# Patient Record
Sex: FEMALE | Race: WHITE | NOT HISPANIC OR LATINO | Employment: FULL TIME | URBAN - NONMETROPOLITAN AREA
[De-identification: names, ages, dates, MRNs, and addresses within clinical notes are randomized per-mention and may not be internally consistent; named-entity substitution may affect disease eponyms.]

---

## 2023-03-21 DIAGNOSIS — I10 HYPERTENSION, UNSPECIFIED TYPE: Primary | ICD-10-CM

## 2023-03-23 RX ORDER — OLMESARTAN MEDOXOMIL AND HYDROCHLOROTHIAZIDE 20/12.5 20; 12.5 MG/1; MG/1
TABLET ORAL
Qty: 30 TABLET | Refills: 0 | Status: SHIPPED | OUTPATIENT
Start: 2023-03-23 | End: 2023-04-12 | Stop reason: SDUPTHER

## 2023-04-06 RX ORDER — ALBUTEROL SULFATE 90 UG/1
AEROSOL, METERED RESPIRATORY (INHALATION)
COMMUNITY
Start: 2020-09-17 | End: 2023-04-12

## 2023-04-06 RX ORDER — ZALEPLON 10 MG/1
CAPSULE ORAL
COMMUNITY
Start: 2020-09-17

## 2023-04-06 RX ORDER — CETIRIZINE HYDROCHLORIDE 10 MG/1
TABLET ORAL
COMMUNITY

## 2023-04-06 RX ORDER — OMEPRAZOLE 20 MG/1
TABLET, DELAYED RELEASE ORAL
COMMUNITY
Start: 2022-07-12

## 2023-04-06 RX ORDER — HYDROXYZINE PAMOATE 25 MG/1
CAPSULE ORAL
COMMUNITY
End: 2023-04-12 | Stop reason: SDUPTHER

## 2023-04-06 RX ORDER — CLINDAMYCIN PHOSPHATE 10 MG/G
GEL TOPICAL
COMMUNITY
Start: 2020-09-17

## 2023-04-06 RX ORDER — KRILL/OM-3/DHA/EPA/PHOSPHO/AST 1000-170MG
CAPSULE ORAL
COMMUNITY
Start: 2020-09-17

## 2023-04-06 RX ORDER — FLUTICASONE PROPIONATE 50 MCG
SPRAY, SUSPENSION (ML) NASAL
COMMUNITY
Start: 2022-12-06 | End: 2023-09-26 | Stop reason: ALTCHOICE

## 2023-04-06 RX ORDER — ACETAMINOPHEN 500 MG
1 TABLET ORAL DAILY
COMMUNITY
Start: 2020-12-01

## 2023-04-06 RX ORDER — CETIRIZINE HYDROCHLORIDE, PSEUDOEPHEDRINE HYDROCHLORIDE 5; 120 MG/1; MG/1
1 TABLET, FILM COATED, EXTENDED RELEASE ORAL EVERY 12 HOURS
COMMUNITY
Start: 2020-09-17 | End: 2023-04-12

## 2023-04-11 PROBLEM — J30.9 ALLERGIC RHINITIS: Status: ACTIVE | Noted: 2023-04-11

## 2023-04-11 PROBLEM — R42 VERTIGO: Status: ACTIVE | Noted: 2023-04-11

## 2023-04-11 PROBLEM — I10 BENIGN ESSENTIAL HYPERTENSION: Status: ACTIVE | Noted: 2023-04-11

## 2023-04-11 PROBLEM — N28.9 ABNORMAL KIDNEY FUNCTION: Status: ACTIVE | Noted: 2023-04-11

## 2023-04-11 PROBLEM — E78.5 HYPERLIPIDEMIA: Status: ACTIVE | Noted: 2023-04-11

## 2023-04-11 PROBLEM — E55.9 VITAMIN D DEFICIENCY: Status: ACTIVE | Noted: 2023-04-11

## 2023-04-11 PROBLEM — K21.9 ESOPHAGEAL REFLUX: Status: ACTIVE | Noted: 2023-04-11

## 2023-04-11 PROBLEM — R06.02 SHORTNESS OF BREATH: Status: ACTIVE | Noted: 2023-04-11

## 2023-04-11 PROBLEM — G47.00 INSOMNIA: Status: ACTIVE | Noted: 2023-04-11

## 2023-04-11 PROBLEM — R94.4 DECREASED GFR: Status: ACTIVE | Noted: 2023-04-11

## 2023-04-11 PROBLEM — N90.4 LICHEN SCLEROSUS ET ATROPHICUS OF THE VULVA: Status: ACTIVE | Noted: 2023-04-11

## 2023-04-11 PROBLEM — G47.33 MILD OBSTRUCTIVE SLEEP APNEA: Status: ACTIVE | Noted: 2023-04-11

## 2023-04-11 PROBLEM — L81.9 DISCOLORATION OF SKIN OF FOOT: Status: ACTIVE | Noted: 2023-04-11

## 2023-04-11 PROBLEM — M81.0 POST-MENOPAUSAL OSTEOPOROSIS: Status: ACTIVE | Noted: 2023-04-11

## 2023-04-12 ENCOUNTER — OFFICE VISIT (OUTPATIENT)
Dept: PRIMARY CARE | Facility: CLINIC | Age: 63
End: 2023-04-12
Payer: COMMERCIAL

## 2023-04-12 VITALS
WEIGHT: 293 LBS | HEART RATE: 75 BPM | BODY MASS INDEX: 44.31 KG/M2 | OXYGEN SATURATION: 97 % | SYSTOLIC BLOOD PRESSURE: 116 MMHG | DIASTOLIC BLOOD PRESSURE: 78 MMHG

## 2023-04-12 DIAGNOSIS — I10 HYPERTENSION, UNSPECIFIED TYPE: Primary | ICD-10-CM

## 2023-04-12 DIAGNOSIS — K21.9 GASTROESOPHAGEAL REFLUX DISEASE WITHOUT ESOPHAGITIS: ICD-10-CM

## 2023-04-12 DIAGNOSIS — E66.01 CLASS 3 SEVERE OBESITY DUE TO EXCESS CALORIES WITH SERIOUS COMORBIDITY AND BODY MASS INDEX (BMI) OF 40.0 TO 44.9 IN ADULT (MULTI): ICD-10-CM

## 2023-04-12 DIAGNOSIS — G47.00 INSOMNIA, UNSPECIFIED TYPE: ICD-10-CM

## 2023-04-12 DIAGNOSIS — J30.9 ALLERGIC RHINITIS, UNSPECIFIED SEASONALITY, UNSPECIFIED TRIGGER: ICD-10-CM

## 2023-04-12 PROBLEM — H43.393 VITREOUS FLOATERS OF BOTH EYES: Status: ACTIVE | Noted: 2023-03-28

## 2023-04-12 PROBLEM — H25.13 AGE-RELATED NUCLEAR CATARACT OF BOTH EYES: Status: ACTIVE | Noted: 2023-03-28

## 2023-04-12 PROCEDURE — 1036F TOBACCO NON-USER: CPT | Performed by: INTERNAL MEDICINE

## 2023-04-12 PROCEDURE — 3008F BODY MASS INDEX DOCD: CPT | Performed by: INTERNAL MEDICINE

## 2023-04-12 PROCEDURE — 3078F DIAST BP <80 MM HG: CPT | Performed by: INTERNAL MEDICINE

## 2023-04-12 PROCEDURE — 99214 OFFICE O/P EST MOD 30 MIN: CPT | Performed by: INTERNAL MEDICINE

## 2023-04-12 PROCEDURE — 1160F RVW MEDS BY RX/DR IN RCRD: CPT | Performed by: INTERNAL MEDICINE

## 2023-04-12 PROCEDURE — G0447 BEHAVIOR COUNSEL OBESITY 15M: HCPCS | Performed by: INTERNAL MEDICINE

## 2023-04-12 PROCEDURE — 3074F SYST BP LT 130 MM HG: CPT | Performed by: INTERNAL MEDICINE

## 2023-04-12 PROCEDURE — 1159F MED LIST DOCD IN RCRD: CPT | Performed by: INTERNAL MEDICINE

## 2023-04-12 RX ORDER — OLMESARTAN MEDOXOMIL AND HYDROCHLOROTHIAZIDE 20/12.5 20; 12.5 MG/1; MG/1
1 TABLET ORAL DAILY
Qty: 90 TABLET | Refills: 0 | Status: SHIPPED | OUTPATIENT
Start: 2023-04-12 | End: 2023-07-25

## 2023-04-12 RX ORDER — HYDROXYZINE PAMOATE 25 MG/1
25 CAPSULE ORAL 2 TIMES DAILY
Qty: 180 CAPSULE | Refills: 0 | Status: SHIPPED | OUTPATIENT
Start: 2023-04-12 | End: 2023-07-09

## 2023-04-12 NOTE — PROGRESS NOTES
Subjective   Patient ID: Arianne Jolly is a 63 y.o. female who presents for Follow-up (3 mth medical management).    HPI    Patient presented for routine follow-up.    Hypertension stable on therapy no side effects    Insomnia stable on therapy no side effects    Allergic rhinitis stable on therapy no side effects    GERD stable on therapy no side effects    Diet and exercise reviewed  I spent >15 minutes minutes face to face with individual  providing recommendations for nutrition choices and exercise plan to help achieve weight reduction.     Review of Systems   All other systems reviewed and are negative.      Objective   Physical Exam  Vitals reviewed.   Constitutional:       Appearance: Normal appearance. She is obese.   HENT:      Head: Normocephalic and atraumatic.      Mouth/Throat:      Pharynx: No posterior oropharyngeal erythema.   Eyes:      General: No scleral icterus.     Conjunctiva/sclera: Conjunctivae normal.      Pupils: Pupils are equal, round, and reactive to light.   Cardiovascular:      Rate and Rhythm: Normal rate and regular rhythm.      Heart sounds: Normal heart sounds.   Pulmonary:      Effort: No respiratory distress.      Breath sounds: No wheezing.   Abdominal:      General: Abdomen is flat. Bowel sounds are normal. There is no distension.      Palpations: Abdomen is soft. There is no mass.      Tenderness: There is no abdominal tenderness. There is no rebound.   Musculoskeletal:         General: Normal range of motion.      Cervical back: Normal range of motion and neck supple.   Skin:     General: Skin is warm and dry.   Neurological:      General: No focal deficit present.      Mental Status: She is alert and oriented to person, place, and time. Mental status is at baseline.   Psychiatric:         Mood and Affect: Mood normal.         Behavior: Behavior normal.         Thought Content: Thought content normal.         Judgment: Judgment normal.         Assessment/Plan   Problem List  Items Addressed This Visit          Nervous    Insomnia    Relevant Medications    hydrOXYzine pamoate (Vistaril) 25 mg capsule       Digestive    Esophageal reflux       Other    Allergic rhinitis     Other Visit Diagnoses       Hypertension, unspecified type    -  Primary    Relevant Medications    olmesartan-hydrochlorothiazide (BENIcar HCT) 20-12.5 mg tablet    Class 3 severe obesity due to excess calories with serious comorbidity and body mass index (BMI) of 40.0 to 44.9 in adult (CMS/[facility])                Hypertension stable on therapy no side effects    Insomnia stable on therapy no side effects    Allergic rhinitis stable on therapy no side effects    GERD stable on therapy no side effects    Diet and exercise reviewed    Follow up 4 months / spending time in [place]

## 2023-07-09 DIAGNOSIS — G47.00 INSOMNIA, UNSPECIFIED TYPE: ICD-10-CM

## 2023-07-09 RX ORDER — HYDROXYZINE PAMOATE 25 MG/1
25 CAPSULE ORAL 2 TIMES DAILY
Qty: 180 CAPSULE | Refills: 0 | Status: SHIPPED | OUTPATIENT
Start: 2023-07-09 | End: 2023-10-06

## 2023-07-24 DIAGNOSIS — I10 HYPERTENSION, UNSPECIFIED TYPE: ICD-10-CM

## 2023-07-25 RX ORDER — OLMESARTAN MEDOXOMIL AND HYDROCHLOROTHIAZIDE 20/12.5 20; 12.5 MG/1; MG/1
1 TABLET ORAL DAILY
Qty: 90 TABLET | Refills: 0 | Status: SHIPPED | OUTPATIENT
Start: 2023-07-25 | End: 2023-10-23

## 2023-08-25 PROBLEM — R10.9 ABDOMINAL PAIN: Status: ACTIVE | Noted: 2023-08-25

## 2023-08-25 PROBLEM — R09.1 PLEURISY: Status: ACTIVE | Noted: 2023-08-25

## 2023-08-25 RX ORDER — MELOXICAM 7.5 MG/1
TABLET ORAL 2 TIMES DAILY
COMMUNITY
Start: 2023-08-21 | End: 2023-08-30

## 2023-08-29 ENCOUNTER — OFFICE VISIT (OUTPATIENT)
Dept: PRIMARY CARE | Facility: CLINIC | Age: 63
End: 2023-08-29
Payer: COMMERCIAL

## 2023-08-29 VITALS
OXYGEN SATURATION: 97 % | WEIGHT: 293 LBS | HEART RATE: 103 BPM | SYSTOLIC BLOOD PRESSURE: 112 MMHG | DIASTOLIC BLOOD PRESSURE: 62 MMHG | BODY MASS INDEX: 42.3 KG/M2

## 2023-08-29 DIAGNOSIS — R09.1 PLEURISY: Primary | ICD-10-CM

## 2023-08-29 DIAGNOSIS — I10 BENIGN ESSENTIAL HYPERTENSION: ICD-10-CM

## 2023-08-29 DIAGNOSIS — Q44.6 CYSTIC DISEASE OF LIVER: ICD-10-CM

## 2023-08-29 DIAGNOSIS — K80.20 CALCULUS OF GALLBLADDER WITHOUT CHOLECYSTITIS WITHOUT OBSTRUCTION: ICD-10-CM

## 2023-08-29 DIAGNOSIS — K21.9 GASTROESOPHAGEAL REFLUX DISEASE WITHOUT ESOPHAGITIS: ICD-10-CM

## 2023-08-29 DIAGNOSIS — E66.01 CLASS 3 SEVERE OBESITY DUE TO EXCESS CALORIES WITH SERIOUS COMORBIDITY AND BODY MASS INDEX (BMI) OF 40.0 TO 44.9 IN ADULT (MULTI): ICD-10-CM

## 2023-08-29 PROCEDURE — 3074F SYST BP LT 130 MM HG: CPT | Performed by: INTERNAL MEDICINE

## 2023-08-29 PROCEDURE — 3008F BODY MASS INDEX DOCD: CPT | Performed by: INTERNAL MEDICINE

## 2023-08-29 PROCEDURE — 3078F DIAST BP <80 MM HG: CPT | Performed by: INTERNAL MEDICINE

## 2023-08-29 PROCEDURE — 1036F TOBACCO NON-USER: CPT | Performed by: INTERNAL MEDICINE

## 2023-08-29 PROCEDURE — 99214 OFFICE O/P EST MOD 30 MIN: CPT | Performed by: INTERNAL MEDICINE

## 2023-08-29 PROCEDURE — 1159F MED LIST DOCD IN RCRD: CPT | Performed by: INTERNAL MEDICINE

## 2023-08-29 PROCEDURE — 1160F RVW MEDS BY RX/DR IN RCRD: CPT | Performed by: INTERNAL MEDICINE

## 2023-08-29 NOTE — PROGRESS NOTES
Subjective   Patient ID: Arianne Jolly is a 63 y.o. female who presents for ER Follow-up (Pleurisy 8-21-23).  ER Follow-up    Post ER follow up    Dx pleurisy  residual dry cough no fever or dyspnea       Cholelithiasis asympt       Liver cysts on CT / US    Hypertension stable on therapy no side effects     Insomnia stable on therapy no side effects     Allergic rhinitis stable on therapy no side effects     GERD stable on therapy no side effects     Diet and exercise reviewed    Review of Systems   All other systems reviewed and are negative.      Objective   /62   Pulse 103   Wt 134 kg (294 lb 12.8 oz)   SpO2 97%   BMI 42.30 kg/m²   Lab Results   Component Value Date    WBC 11.4 (H) 08/21/2023    HGB 12.7 08/21/2023    HCT 39.6 08/21/2023     08/21/2023    CHOL 181 11/17/2022    TRIG 134 11/17/2022    HDL 55.0 11/17/2022    ALT 57 (H) 08/21/2023    AST 36 08/21/2023     08/21/2023    K 3.4 (L) 08/21/2023     08/21/2023    CREATININE 1.07 (H) 08/21/2023    BUN 22 08/21/2023    CO2 28 08/21/2023    TSH 2.91 11/17/2022    HGBA1C 5.9 09/30/2020           Physical Exam  Vitals reviewed.   Constitutional:       Appearance: Normal appearance. She is obese.   HENT:      Head: Normocephalic and atraumatic.      Mouth/Throat:      Pharynx: No posterior oropharyngeal erythema.   Eyes:      General: No scleral icterus.     Conjunctiva/sclera: Conjunctivae normal.      Pupils: Pupils are equal, round, and reactive to light.   Cardiovascular:      Rate and Rhythm: Normal rate and regular rhythm.      Heart sounds: Normal heart sounds.   Pulmonary:      Effort: No respiratory distress.      Breath sounds: No wheezing.   Abdominal:      General: Abdomen is flat. Bowel sounds are normal. There is no distension.      Palpations: Abdomen is soft. There is no mass.      Tenderness: There is no abdominal tenderness. There is no rebound.   Musculoskeletal:         General: Normal range of motion.       Cervical back: Normal range of motion and neck supple.   Skin:     General: Skin is warm and dry.   Neurological:      General: No focal deficit present.      Mental Status: She is alert and oriented to person, place, and time. Mental status is at baseline.   Psychiatric:         Mood and Affect: Mood normal.         Behavior: Behavior normal.         Thought Content: Thought content normal.         Judgment: Judgment normal.         Problem List Items Addressed This Visit          Cardiac and Vasculature    Benign essential hypertension       Gastrointestinal and Abdominal    Esophageal reflux       Pulmonary and Pneumonias    Pleurisy - Primary     Other Visit Diagnoses       Cystic disease of liver        Relevant Orders    MR liver w and wo IV contrast    Calculus of gallbladder without cholecystitis without obstruction        Class 3 severe obesity due to excess calories with serious comorbidity and body mass index (BMI) of 40.0 to 44.9 in adult (CMS/HCC)              Assessment/Plan     Post ER follow up    Dx pleurisy  residual dry cough no fever or dyspnea pain controlled on rx       Cholelithiasis asymptomatic       Liver cysts on CT / US   MRI ordered    Hypertension stable on therapy no side effects     Insomnia stable on therapy no side effects     Allergic rhinitis stable on therapy no side effects     GERD stable on therapy no side effects     Diet and exercise reviewed    Mammogram 11-22  Dexa 11-22  Cologuard due 23  CT chest lung cancer screening n/a  GYN n/a  immunizations rev'd  BMI 42.3    Follow up 1 month / yearly physical

## 2023-09-26 ENCOUNTER — OFFICE VISIT (OUTPATIENT)
Dept: PRIMARY CARE | Facility: CLINIC | Age: 63
End: 2023-09-26
Payer: COMMERCIAL

## 2023-09-26 VITALS
SYSTOLIC BLOOD PRESSURE: 124 MMHG | DIASTOLIC BLOOD PRESSURE: 84 MMHG | BODY MASS INDEX: 43.4 KG/M2 | HEART RATE: 84 BPM | WEIGHT: 293 LBS | OXYGEN SATURATION: 95 % | HEIGHT: 69 IN

## 2023-09-26 DIAGNOSIS — Q44.6 POLYCYSTIC LIVER DISEASE: ICD-10-CM

## 2023-09-26 DIAGNOSIS — E55.9 VITAMIN D DEFICIENCY: ICD-10-CM

## 2023-09-26 DIAGNOSIS — K80.20 CALCULUS OF GALLBLADDER WITHOUT CHOLECYSTITIS WITHOUT OBSTRUCTION: ICD-10-CM

## 2023-09-26 DIAGNOSIS — K21.9 GASTROESOPHAGEAL REFLUX DISEASE WITHOUT ESOPHAGITIS: ICD-10-CM

## 2023-09-26 DIAGNOSIS — E66.01 CLASS 3 SEVERE OBESITY DUE TO EXCESS CALORIES WITH SERIOUS COMORBIDITY AND BODY MASS INDEX (BMI) OF 40.0 TO 44.9 IN ADULT (MULTI): ICD-10-CM

## 2023-09-26 DIAGNOSIS — Z12.31 ENCOUNTER FOR SCREENING MAMMOGRAM FOR MALIGNANT NEOPLASM OF BREAST: ICD-10-CM

## 2023-09-26 DIAGNOSIS — I10 BENIGN ESSENTIAL HYPERTENSION: ICD-10-CM

## 2023-09-26 DIAGNOSIS — E78.00 HYPERCHOLESTEREMIA: ICD-10-CM

## 2023-09-26 DIAGNOSIS — J30.9 ALLERGIC RHINITIS, UNSPECIFIED SEASONALITY, UNSPECIFIED TRIGGER: ICD-10-CM

## 2023-09-26 DIAGNOSIS — Z00.00 ANNUAL PHYSICAL EXAM: Primary | ICD-10-CM

## 2023-09-26 PROCEDURE — 3008F BODY MASS INDEX DOCD: CPT | Performed by: INTERNAL MEDICINE

## 2023-09-26 PROCEDURE — 99214 OFFICE O/P EST MOD 30 MIN: CPT | Performed by: INTERNAL MEDICINE

## 2023-09-26 PROCEDURE — 3079F DIAST BP 80-89 MM HG: CPT | Performed by: INTERNAL MEDICINE

## 2023-09-26 PROCEDURE — 1159F MED LIST DOCD IN RCRD: CPT | Performed by: INTERNAL MEDICINE

## 2023-09-26 PROCEDURE — 1160F RVW MEDS BY RX/DR IN RCRD: CPT | Performed by: INTERNAL MEDICINE

## 2023-09-26 PROCEDURE — 1170F FXNL STATUS ASSESSED: CPT | Performed by: INTERNAL MEDICINE

## 2023-09-26 PROCEDURE — 1036F TOBACCO NON-USER: CPT | Performed by: INTERNAL MEDICINE

## 2023-09-26 PROCEDURE — 3074F SYST BP LT 130 MM HG: CPT | Performed by: INTERNAL MEDICINE

## 2023-09-26 PROCEDURE — 99397 PER PM REEVAL EST PAT 65+ YR: CPT | Performed by: INTERNAL MEDICINE

## 2023-09-26 RX ORDER — BENZONATATE 100 MG/1
CAPSULE ORAL
COMMUNITY
Start: 2023-09-07 | End: 2024-04-10 | Stop reason: ALTCHOICE

## 2023-09-26 RX ORDER — CETIRIZINE HYDROCHLORIDE, PSEUDOEPHEDRINE HYDROCHLORIDE 5; 120 MG/1; MG/1
TABLET, FILM COATED, EXTENDED RELEASE ORAL
COMMUNITY
Start: 2023-09-25

## 2023-09-26 ASSESSMENT — ACTIVITIES OF DAILY LIVING (ADL)
BATHING: INDEPENDENT
DOING_HOUSEWORK: INDEPENDENT
TAKING_MEDICATION: INDEPENDENT
DRESSING: INDEPENDENT
GROCERY_SHOPPING: INDEPENDENT
MANAGING_FINANCES: INDEPENDENT

## 2023-09-26 ASSESSMENT — PATIENT HEALTH QUESTIONNAIRE - PHQ9
2. FEELING DOWN, DEPRESSED OR HOPELESS: NOT AT ALL
SUM OF ALL RESPONSES TO PHQ9 QUESTIONS 1 AND 2: 0
1. LITTLE INTEREST OR PLEASURE IN DOING THINGS: NOT AT ALL

## 2023-09-26 ASSESSMENT — ENCOUNTER SYMPTOMS
DEPRESSION: 0
LOSS OF SENSATION IN FEET: 1
OCCASIONAL FEELINGS OF UNSTEADINESS: 0

## 2023-09-26 NOTE — PROGRESS NOTES
"Subjective   Patient ID: Arianne Jolly is a 63 y.o. female who presents for Annual Exam and Results (MRI liver).  HPI    Yearly physical    Mammogram 11-22  Dexa 11-22  Cologuard due 23  CT chest lung cancer screening n/a  GYN n/a  immunizations rev'd  BMI 43.6    Follow up    pleurisy  basically resolved    Cholelithiasis asymptomatic    Liver cysts on CT / US   MRI discussed  Colonoscopy due  GI consult     Hypertension stable on therapy no side effects     Insomnia stable on therapy no side effects     Allergic rhinitis stable on therapy no side effects     GERD stable on therapy no side effects     Diet and exercise reviewed     Review of Systems   All other systems reviewed and are negative.      Objective   /84   Pulse 84   Ht 1.753 m (5' 9\")   Wt 134 kg (295 lb 6.4 oz)   SpO2 95%   BMI 43.62 kg/m²   Lab Results   Component Value Date    WBC 11.4 (H) 08/21/2023    HGB 12.7 08/21/2023    HCT 39.6 08/21/2023     08/21/2023    CHOL 181 11/17/2022    TRIG 134 11/17/2022    HDL 55.0 11/17/2022    ALT 57 (H) 08/21/2023    AST 36 08/21/2023     08/21/2023    K 3.4 (L) 08/21/2023     08/21/2023    CREATININE 1.07 (H) 08/21/2023    BUN 22 08/21/2023    CO2 28 08/21/2023    TSH 2.91 11/17/2022    HGBA1C 5.9 09/30/2020           Physical Exam  Vitals reviewed.   Constitutional:       Appearance: Normal appearance. She is obese.   HENT:      Head: Normocephalic and atraumatic.      Mouth/Throat:      Pharynx: No posterior oropharyngeal erythema.   Eyes:      General: No scleral icterus.     Conjunctiva/sclera: Conjunctivae normal.      Pupils: Pupils are equal, round, and reactive to light.   Cardiovascular:      Rate and Rhythm: Normal rate and regular rhythm.      Heart sounds: Normal heart sounds.   Pulmonary:      Effort: No respiratory distress.      Breath sounds: No wheezing.   Abdominal:      General: Abdomen is flat. Bowel sounds are normal. There is no distension.      Palpations: " Abdomen is soft. There is no mass.      Tenderness: There is no abdominal tenderness. There is no rebound.   Musculoskeletal:         General: Normal range of motion.      Cervical back: Normal range of motion and neck supple.   Skin:     General: Skin is warm and dry.   Neurological:      General: No focal deficit present.      Mental Status: She is alert and oriented to person, place, and time. Mental status is at baseline.   Psychiatric:         Mood and Affect: Mood normal.         Behavior: Behavior normal.         Thought Content: Thought content normal.         Judgment: Judgment normal.       Problem List Items Addressed This Visit             ICD-10-CM       Cardiac and Vasculature    Benign essential hypertension I10       ENT    Allergic rhinitis J30.9       Endocrine/Metabolic    Vitamin D deficiency E55.9    Relevant Orders    Vitamin D 25-Hydroxy,Total (for eval of Vitamin D levels)       Gastrointestinal and Abdominal    Esophageal reflux K21.9     Other Visit Diagnoses         Codes    Annual physical exam    -  Primary Z00.00    Relevant Orders    CBC and Auto Differential    Comprehensive Metabolic Panel    Lipid Panel    TSH with reflex to Free T4 if abnormal    Polycystic liver disease     Q44.6    Relevant Orders    Referral to Gastroenterology    Calculus of gallbladder without cholecystitis without obstruction     K80.20    Encounter for screening mammogram for malignant neoplasm of breast     Z12.31    Relevant Orders    BI mammo bilateral screening tomosynthesis    Hypercholesteremia     E78.00    Class 3 severe obesity due to excess calories with serious comorbidity and body mass index (BMI) of 40.0 to 44.9 in adult (CMS/HCC)     E66.01, Z68.41          Assessment/Plan     Yearly physical    Mammogram 11-22  Dexa 11-22  Cologuard due 23  CT chest lung cancer screening n/a  GYN n/a  immunizations rev'd  BMI 43.6    Follow up    pleurisy  basically resolved    Cholelithiasis asymptomatic  Low  fat diet discussed    Liver cysts on CT / US   MRI discussed  Colonoscopy due  GI consult     Hypertension stable on therapy no side effects     Insomnia stable on therapy no side effects     Allergic rhinitis stable on therapy no side effects     GERD stable on therapy no side effects     Diet and exercise reviewed   I spent >15 minutes minutes face to face with individual  providing recommendations for nutrition choices and exercise plan to help achieve weight reduction.     Check labs and mammogram    Follow up 3 months

## 2023-10-05 DIAGNOSIS — G47.00 INSOMNIA, UNSPECIFIED TYPE: ICD-10-CM

## 2023-10-06 RX ORDER — HYDROXYZINE PAMOATE 25 MG/1
25 CAPSULE ORAL 2 TIMES DAILY
Qty: 180 CAPSULE | Refills: 0 | Status: SHIPPED | OUTPATIENT
Start: 2023-10-06 | End: 2024-01-04

## 2023-10-23 DIAGNOSIS — I10 HYPERTENSION, UNSPECIFIED TYPE: ICD-10-CM

## 2023-10-23 RX ORDER — OLMESARTAN MEDOXOMIL AND HYDROCHLOROTHIAZIDE 20/12.5 20; 12.5 MG/1; MG/1
1 TABLET ORAL DAILY
Qty: 90 TABLET | Refills: 0 | Status: SHIPPED | OUTPATIENT
Start: 2023-10-23 | End: 2023-12-19 | Stop reason: SDUPTHER

## 2023-11-29 ENCOUNTER — OFFICE VISIT (OUTPATIENT)
Dept: GASTROENTEROLOGY | Facility: CLINIC | Age: 63
End: 2023-11-29
Payer: COMMERCIAL

## 2023-11-29 VITALS — HEART RATE: 78 BPM | HEIGHT: 69 IN | BODY MASS INDEX: 43.62 KG/M2

## 2023-11-29 DIAGNOSIS — Q44.6 POLYCYSTIC LIVER DISEASE: ICD-10-CM

## 2023-11-29 DIAGNOSIS — K21.9 GASTROESOPHAGEAL REFLUX DISEASE, UNSPECIFIED WHETHER ESOPHAGITIS PRESENT: Primary | ICD-10-CM

## 2023-11-29 PROCEDURE — 1036F TOBACCO NON-USER: CPT | Performed by: INTERNAL MEDICINE

## 2023-11-29 PROCEDURE — 99204 OFFICE O/P NEW MOD 45 MIN: CPT | Performed by: INTERNAL MEDICINE

## 2023-11-29 PROCEDURE — 1159F MED LIST DOCD IN RCRD: CPT | Performed by: INTERNAL MEDICINE

## 2023-11-29 PROCEDURE — 3008F BODY MASS INDEX DOCD: CPT | Performed by: INTERNAL MEDICINE

## 2023-11-29 PROCEDURE — 1160F RVW MEDS BY RX/DR IN RCRD: CPT | Performed by: INTERNAL MEDICINE

## 2023-11-29 NOTE — PROGRESS NOTES
REASON FOR VISIT: To discuss multiple liver cysts    HPI:  Arianne Jolly is a 63 y.o. female with a past medical history of hypertension, morbid obesity, hyperlipidemia, GERD and osteoporosis being evaluated in the office for recent finding of multiple hepatic and renal cysts.  Imaging showed greater than 20 hepatic cysts.  On MRI appear to be benign appearing cyst without concerning features and most consistent with polycystic liver disease.  Patient does not have any known family history of polycystic kidney or liver disease.  Has not had any focal regular right upper quadrant pain symptoms.  Recently diagnosed with pleurisy and the pain is subsided.  Has history of mild cardiac murmur and had remote echo done.          PRIOR ENDOSCOPY    PAST MEDICAL HISTORY  No past medical history on file.    PAST SURGICAL HISTORY  Past Surgical History:   Procedure Laterality Date    OTHER SURGICAL HISTORY  2022    Dilation and curettage       FAMILY HISTORY  No family history on file.    SOCIAL HISTORY  Social History     Tobacco Use    Smoking status: Former     Types: Cigarettes     Quit date:      Years since quittin.9    Smokeless tobacco: Never   Substance Use Topics    Alcohol use: Not Currently     Comment: rarely       REVIEW OF SYSTEMS  CONSTITUTIONAL: negative for fever, chills, fatigue, or unintentional weight loss,   HEENT: negative for icteric sclera, eye pain/redness, or changes in vision/hearing  RESPIRATORY: negative for cough, hemoptysis, wheezing, orthopnea, or dyspnea on exertion  CARDIOVASCULAR: negative for chest pain, palpitations, or syncope   GASTROINTESTINAL: as noted per HPI  GENITOURINARY: negative for dysuria, polyuria, incontinence, or hematuria  MUSCULOSKELETAL: negative for arthralgia, myalgia, or joint swelling/stiffness   INTEGUMENTARY/SKIN: negative jaundice, rash, or skin lesion  HEMATOLOGIC/LYMPHATIC: negative for prolonged bleeding, easy bruising, or swollen lymph  "nodes  ENDOCRINE: negative for cold/heat intolerance, polydipsia, polyuria, or goiter  NEUROLOGIC: negative for headaches, dizziness, tremor, or gait abnormality  PSYCHIATRIC: negative for anxiety, depression, personality changes, or sleep disturbances      A 10 point review of systems was completed and was otherwise negative.    ALLERGIES  Allergies   Allergen Reactions    Cat Hair Standardized Allergenic Extract Other     congestion    Varicella-Zoster Virus Glycoprotein E, Recombinant Swelling    Weed Pollen-Dog Fennel Other     congestion       MEDICATIONS  Current Outpatient Medications   Medication Sig Dispense Refill    benzonatate (Tessalon) 100 mg capsule TAKE 1 TO 2 CAPSULES BY MOUTH 3 TIMES A DAY      cetirizine (ZyrTEC) 10 mg tablet Take by mouth.      cetirizine-pseudoephedrine (ZyrTEC-D) 5-120 mg 12 hr tablet       cholecalciferol (Vitamin D-3) 5,000 Units tablet Take 1 tablet (5,000 Units) by mouth once daily.      clindamycin (Cleocin T) 1 % gel Cleocin-T 1 % GEL   Refills: 0        Start : 17-Sep-2020   Active  30 GM Tube      hydrOXYzine pamoate (Vistaril) 25 mg capsule TAKE 1 CAPSULE (25 MG) BY MOUTH IN THE MORNING AND 1 CAPSULE (25 MG) BEFORE BEDTIME. 180 capsule 0    krill-om-3-dha-epa-phospho-ast (krill oil) 1,831-212-42-80 mg capsule Take by mouth.      LUTEIN-ZEAXANTHIN ORAL Take 1 tablet by mouth once daily.      olmesartan-hydrochlorothiazide (BENIcar HCT) 20-12.5 mg tablet TAKE 1 TABLET BY MOUTH EVERY DAY 90 tablet 0    omeprazole OTC (PriLOSEC OTC) 20 mg EC tablet Take by mouth.      zaleplon (Sonata) 10 mg capsule Take by mouth.       No current facility-administered medications for this visit.       VITALS  Pulse 78   Ht 1.753 m (5' 9\")   BMI 43.62 kg/m²      PHYSICAL EXAM  Alert and oriented in no acute distress    ASSESSMENT/ PLAN  Patient with polycystic liver disease.  I discussed the benign nature of this.  Currently not causing any pain that I discussed with her that if cyst is " large enough pain can be a potential result which case more aggressive measures can be considered including surgical.  She has no family history of cerebral aneurysm and therefore would hold off on any screening for cerebral aneurysms.  There is increased risk of heart valve disease and therefore I would recommend considering a repeat echo at this point given history of murmur.  I discussed with her that this is highly unlikely to affect liver function in the past.  She will notify me if she develops right upper quadrant pain that persists.  We discussed colorectal cancer screening and the risks and benefits of that.  She would like to proceed with Cologuard which she will discuss with her primary care physician.  She will follow-up in the office as needed.        Signature: Cathy Ferrara MD    Date: 11/29/2023  Time: 3:08 PM

## 2023-12-07 ENCOUNTER — HOSPITAL ENCOUNTER (OUTPATIENT)
Dept: RADIOLOGY | Facility: HOSPITAL | Age: 63
Discharge: HOME | End: 2023-12-07
Payer: COMMERCIAL

## 2023-12-07 DIAGNOSIS — M06.4 INFLAMMATORY POLYARTHROPATHY (MULTI): ICD-10-CM

## 2023-12-07 DIAGNOSIS — M54.9 DORSALGIA, UNSPECIFIED: ICD-10-CM

## 2023-12-07 PROCEDURE — 72110 X-RAY EXAM L-2 SPINE 4/>VWS: CPT

## 2023-12-07 PROCEDURE — 73130 X-RAY EXAM OF HAND: CPT | Mod: 50,FY

## 2023-12-19 ENCOUNTER — OFFICE VISIT (OUTPATIENT)
Dept: PRIMARY CARE | Facility: CLINIC | Age: 63
End: 2023-12-19
Payer: COMMERCIAL

## 2023-12-19 VITALS
WEIGHT: 293 LBS | HEART RATE: 76 BPM | DIASTOLIC BLOOD PRESSURE: 80 MMHG | BODY MASS INDEX: 43.83 KG/M2 | OXYGEN SATURATION: 98 % | SYSTOLIC BLOOD PRESSURE: 120 MMHG

## 2023-12-19 DIAGNOSIS — Q44.6 POLYCYSTIC LIVER DISEASE: ICD-10-CM

## 2023-12-19 DIAGNOSIS — E66.01 CLASS 3 SEVERE OBESITY DUE TO EXCESS CALORIES WITH SERIOUS COMORBIDITY AND BODY MASS INDEX (BMI) OF 40.0 TO 44.9 IN ADULT (MULTI): ICD-10-CM

## 2023-12-19 DIAGNOSIS — I10 HYPERTENSION, UNSPECIFIED TYPE: Primary | ICD-10-CM

## 2023-12-19 DIAGNOSIS — Z12.11 ENCOUNTER FOR SCREENING FOR MALIGNANT NEOPLASM OF COLON: ICD-10-CM

## 2023-12-19 PROCEDURE — 1160F RVW MEDS BY RX/DR IN RCRD: CPT | Performed by: INTERNAL MEDICINE

## 2023-12-19 PROCEDURE — 99214 OFFICE O/P EST MOD 30 MIN: CPT | Performed by: INTERNAL MEDICINE

## 2023-12-19 PROCEDURE — 3079F DIAST BP 80-89 MM HG: CPT | Performed by: INTERNAL MEDICINE

## 2023-12-19 PROCEDURE — 1159F MED LIST DOCD IN RCRD: CPT | Performed by: INTERNAL MEDICINE

## 2023-12-19 PROCEDURE — 3008F BODY MASS INDEX DOCD: CPT | Performed by: INTERNAL MEDICINE

## 2023-12-19 PROCEDURE — 3074F SYST BP LT 130 MM HG: CPT | Performed by: INTERNAL MEDICINE

## 2023-12-19 PROCEDURE — 1036F TOBACCO NON-USER: CPT | Performed by: INTERNAL MEDICINE

## 2023-12-19 RX ORDER — OLMESARTAN MEDOXOMIL AND HYDROCHLOROTHIAZIDE 20/12.5 20; 12.5 MG/1; MG/1
1 TABLET ORAL DAILY
Qty: 90 TABLET | Refills: 0 | Status: SHIPPED | OUTPATIENT
Start: 2023-12-19 | End: 2024-04-10 | Stop reason: SDUPTHER

## 2023-12-19 NOTE — PROGRESS NOTES
Subjective   Patient ID: Arianne Jolly is a 63 y.o. female who presents for 3month medical management.  HPI    Routine follow up    No complaints    rheum following arthritis    pleurisy  basically resolved     Cholelithiasis asymptomatic  Low fat diet discussed     Liver cysts on CT / US   MRI discussed  Cologuard ordered  GI no further workup     Hypertension stable on therapy no side effects     Insomnia stable on therapy no side effects     Allergic rhinitis stable on therapy no side effects     GERD stable on therapy no side effects     Diet and exercise reviewed       Check labs and mammogram/ not done    Review of Systems   All other systems reviewed and are negative.      Objective   /80   Pulse 76   Wt 135 kg (296 lb 12.8 oz)   SpO2 98%   BMI 43.83 kg/m²   Lab Results   Component Value Date    WBC 11.4 (H) 08/21/2023    HGB 12.7 08/21/2023    HCT 39.6 08/21/2023     08/21/2023    CHOL 181 11/17/2022    TRIG 134 11/17/2022    HDL 55.0 11/17/2022    ALT 57 (H) 08/21/2023    AST 36 08/21/2023     08/21/2023    K 3.4 (L) 08/21/2023     08/21/2023    CREATININE 1.07 (H) 08/21/2023    BUN 22 08/21/2023    CO2 28 08/21/2023    TSH 2.91 11/17/2022    HGBA1C 5.9 09/30/2020           Physical Exam  Vitals reviewed.   Constitutional:       Appearance: Normal appearance. She is obese.   HENT:      Head: Normocephalic and atraumatic.      Mouth/Throat:      Pharynx: No posterior oropharyngeal erythema.   Eyes:      General: No scleral icterus.     Conjunctiva/sclera: Conjunctivae normal.      Pupils: Pupils are equal, round, and reactive to light.   Cardiovascular:      Rate and Rhythm: Normal rate and regular rhythm.      Heart sounds: Normal heart sounds.   Pulmonary:      Effort: No respiratory distress.      Breath sounds: No wheezing.   Abdominal:      General: Abdomen is flat. Bowel sounds are normal. There is no distension.      Palpations: Abdomen is soft. There is no mass.       Tenderness: There is no abdominal tenderness. There is no rebound.   Musculoskeletal:         General: Normal range of motion.      Cervical back: Normal range of motion and neck supple.   Skin:     General: Skin is warm and dry.   Neurological:      General: No focal deficit present.      Mental Status: She is alert and oriented to person, place, and time. Mental status is at baseline.   Psychiatric:         Mood and Affect: Mood normal.         Behavior: Behavior normal.         Thought Content: Thought content normal.         Judgment: Judgment normal.         Problem List Items Addressed This Visit    None  Visit Diagnoses         Codes    Hypertension, unspecified type    -  Primary I10    Relevant Medications    olmesartan-hydrochlorothiazide (BENIcar HCT) 20-12.5 mg tablet    Polycystic liver disease     Q44.6    Encounter for screening for malignant neoplasm of colon     Z12.11    Relevant Orders    Cologuard® colon cancer screening    Class 3 severe obesity due to excess calories with serious comorbidity and body mass index (BMI) of 40.0 to 44.9 in adult (CMS/[facility])     E66.01, Z68.41          Assessment/Plan   No complaints    rheum following arthritis    pleurisy  basically resolved     Cholelithiasis asymptomatic  Low fat diet discussed     Liver cysts on CT / US   MRI discussed  Cologuard ordered  GI no further workup     Hypertension stable on therapy no side effects     Insomnia stable on therapy no side effects     Allergic rhinitis stable on therapy no side effects     GERD stable on therapy no side effects     Diet and exercise reviewed       Check labs and mammogram/ not done    Mammogram 11-22  Dexa 11-22  Cologuard ordered 23  CT chest lung cancer screening n/a  GYN n/a  immunizations rev'd UTD  BMI 43.8    Follow up 3 months

## 2024-01-02 ENCOUNTER — APPOINTMENT (OUTPATIENT)
Dept: RADIOLOGY | Facility: HOSPITAL | Age: 64
End: 2024-01-02
Payer: COMMERCIAL

## 2024-01-02 DIAGNOSIS — G47.00 INSOMNIA, UNSPECIFIED TYPE: ICD-10-CM

## 2024-01-03 ENCOUNTER — APPOINTMENT (OUTPATIENT)
Dept: GASTROENTEROLOGY | Facility: HOSPITAL | Age: 64
End: 2024-01-03
Payer: COMMERCIAL

## 2024-01-04 RX ORDER — HYDROXYZINE PAMOATE 25 MG/1
25 CAPSULE ORAL 2 TIMES DAILY
Qty: 180 CAPSULE | Refills: 0 | Status: SHIPPED | OUTPATIENT
Start: 2024-01-04 | End: 2024-03-26

## 2024-01-09 ENCOUNTER — HOSPITAL ENCOUNTER (OUTPATIENT)
Dept: RADIOLOGY | Facility: HOSPITAL | Age: 64
Discharge: HOME | End: 2024-01-09
Payer: COMMERCIAL

## 2024-01-09 DIAGNOSIS — Z12.31 ENCOUNTER FOR SCREENING MAMMOGRAM FOR MALIGNANT NEOPLASM OF BREAST: ICD-10-CM

## 2024-01-09 PROCEDURE — 77067 SCR MAMMO BI INCL CAD: CPT | Mod: BILATERAL PROCEDURE | Performed by: RADIOLOGY

## 2024-01-09 PROCEDURE — 77067 SCR MAMMO BI INCL CAD: CPT

## 2024-01-09 PROCEDURE — 77063 BREAST TOMOSYNTHESIS BI: CPT | Mod: BILATERAL PROCEDURE | Performed by: RADIOLOGY

## 2024-01-24 ENCOUNTER — APPOINTMENT (OUTPATIENT)
Dept: GASTROENTEROLOGY | Facility: HOSPITAL | Age: 64
End: 2024-01-24
Payer: COMMERCIAL

## 2024-01-31 LAB — NONINV COLON CA DNA+OCC BLD SCRN STL QL: NORMAL

## 2024-02-19 DIAGNOSIS — R19.5 POSITIVE COLORECTAL CANCER SCREENING USING COLOGUARD TEST: Primary | ICD-10-CM

## 2024-02-19 LAB — NONINV COLON CA DNA+OCC BLD SCRN STL QL: POSITIVE

## 2024-02-20 NOTE — RESULT ENCOUNTER NOTE
Please call the patient regarding her abnormal result.  Needs to have colonoscopy for positive result / ordered

## 2024-03-26 DIAGNOSIS — G47.00 INSOMNIA, UNSPECIFIED TYPE: ICD-10-CM

## 2024-03-26 RX ORDER — HYDROXYZINE PAMOATE 25 MG/1
25 CAPSULE ORAL 2 TIMES DAILY
Qty: 60 CAPSULE | Refills: 0 | Status: SHIPPED | OUTPATIENT
Start: 2024-03-26 | End: 2024-04-23

## 2024-04-09 ENCOUNTER — APPOINTMENT (OUTPATIENT)
Dept: PRIMARY CARE | Facility: CLINIC | Age: 64
End: 2024-04-09
Payer: COMMERCIAL

## 2024-04-10 ENCOUNTER — OFFICE VISIT (OUTPATIENT)
Dept: PRIMARY CARE | Facility: CLINIC | Age: 64
End: 2024-04-10
Payer: COMMERCIAL

## 2024-04-10 VITALS
SYSTOLIC BLOOD PRESSURE: 122 MMHG | OXYGEN SATURATION: 97 % | BODY MASS INDEX: 44.45 KG/M2 | DIASTOLIC BLOOD PRESSURE: 80 MMHG | WEIGHT: 293 LBS | HEART RATE: 85 BPM

## 2024-04-10 DIAGNOSIS — E66.01 CLASS 3 SEVERE OBESITY DUE TO EXCESS CALORIES WITH SERIOUS COMORBIDITY AND BODY MASS INDEX (BMI) OF 40.0 TO 44.9 IN ADULT (MULTI): ICD-10-CM

## 2024-04-10 DIAGNOSIS — K21.9 GASTROESOPHAGEAL REFLUX DISEASE WITHOUT ESOPHAGITIS: ICD-10-CM

## 2024-04-10 DIAGNOSIS — I10 HYPERTENSION, UNSPECIFIED TYPE: ICD-10-CM

## 2024-04-10 DIAGNOSIS — R19.5 POSITIVE COLORECTAL CANCER SCREENING USING COLOGUARD TEST: ICD-10-CM

## 2024-04-10 DIAGNOSIS — Z00.00 ANNUAL PHYSICAL EXAM: Primary | ICD-10-CM

## 2024-04-10 PROCEDURE — 1036F TOBACCO NON-USER: CPT | Performed by: INTERNAL MEDICINE

## 2024-04-10 PROCEDURE — 99214 OFFICE O/P EST MOD 30 MIN: CPT | Performed by: INTERNAL MEDICINE

## 2024-04-10 PROCEDURE — 3074F SYST BP LT 130 MM HG: CPT | Performed by: INTERNAL MEDICINE

## 2024-04-10 PROCEDURE — 3079F DIAST BP 80-89 MM HG: CPT | Performed by: INTERNAL MEDICINE

## 2024-04-10 PROCEDURE — 1160F RVW MEDS BY RX/DR IN RCRD: CPT | Performed by: INTERNAL MEDICINE

## 2024-04-10 PROCEDURE — 1159F MED LIST DOCD IN RCRD: CPT | Performed by: INTERNAL MEDICINE

## 2024-04-10 PROCEDURE — 99397 PER PM REEVAL EST PAT 65+ YR: CPT | Performed by: INTERNAL MEDICINE

## 2024-04-10 PROCEDURE — 3008F BODY MASS INDEX DOCD: CPT | Performed by: INTERNAL MEDICINE

## 2024-04-10 RX ORDER — ALLOPURINOL 100 MG/1
100 TABLET ORAL DAILY
COMMUNITY

## 2024-04-10 RX ORDER — OLMESARTAN MEDOXOMIL AND HYDROCHLOROTHIAZIDE 20/12.5 20; 12.5 MG/1; MG/1
1 TABLET ORAL DAILY
Qty: 90 TABLET | Refills: 0 | Status: SHIPPED | OUTPATIENT
Start: 2024-04-10

## 2024-04-10 NOTE — PROGRESS NOTES
"Subjective   Patient ID: Arianne Jolly \"Sotero" is a 64 y.o. female who presents for yearly physical / Follow-up (4 mth ).  HPI    Yearly physical    Mammogram 1-24  Dexa 11-22 normal  Cologuard +  2-24  CT chest lung cancer screening n/a  GYN n/a  immunizations rev'd UTD  BMI  44.4    Follow up    No complaints    Cologuard positive  Colonoscopy not done yet     rheum following arthritis     pleurisy  basically resolved     Cholelithiasis asymptomatic  Low fat diet discussed     Liver cysts on CT / US   MRI discussed  GI no further workup     Hypertension stable on therapy no side effects     Insomnia stable on therapy no side effects     Allergic rhinitis stable on therapy no side effects     GERD stable on therapy no side effects     Diet and exercise reviewed       Check labs / not done    Review of Systems   All other systems reviewed and are negative.      Objective   /80   Pulse 85   Wt 137 kg (301 lb)   SpO2 97%   BMI 44.45 kg/m²   Lab Results   Component Value Date    WBC 11.4 (H) 08/21/2023    HGB 12.7 08/21/2023    HCT 39.6 08/21/2023     08/21/2023    CHOL 181 11/17/2022    TRIG 134 11/17/2022    HDL 55.0 11/17/2022    ALT 57 (H) 08/21/2023    AST 36 08/21/2023     08/21/2023    K 3.4 (L) 08/21/2023     08/21/2023    CREATININE 1.07 (H) 08/21/2023    BUN 22 08/21/2023    CO2 28 08/21/2023    TSH 2.91 11/17/2022    HGBA1C 5.9 09/30/2020           Physical Exam  Vitals reviewed.   Constitutional:       Appearance: Normal appearance. She is obese.   HENT:      Head: Normocephalic and atraumatic.      Mouth/Throat:      Pharynx: No posterior oropharyngeal erythema.   Eyes:      General: No scleral icterus.     Conjunctiva/sclera: Conjunctivae normal.      Pupils: Pupils are equal, round, and reactive to light.   Cardiovascular:      Rate and Rhythm: Normal rate and regular rhythm.      Heart sounds: Normal heart sounds.   Pulmonary:      Effort: No respiratory distress.      Breath " sounds: No wheezing.   Abdominal:      General: Abdomen is flat. Bowel sounds are normal. There is no distension.      Palpations: Abdomen is soft. There is no mass.      Tenderness: There is no abdominal tenderness. There is no rebound.   Musculoskeletal:         General: Normal range of motion.      Cervical back: Normal range of motion and neck supple.   Skin:     General: Skin is warm and dry.   Neurological:      General: No focal deficit present.      Mental Status: She is alert and oriented to person, place, and time. Mental status is at baseline.   Psychiatric:         Mood and Affect: Mood normal.         Behavior: Behavior normal.         Thought Content: Thought content normal.         Judgment: Judgment normal.         Problem List Items Addressed This Visit             ICD-10-CM    Esophageal reflux K21.9     Other Visit Diagnoses         Codes    Annual physical exam    -  Primary Z00.00    Positive colorectal cancer screening using Cologuard test     R19.5    Hypertension, unspecified type     I10    Relevant Medications    olmesartan-hydrochlorothiazide (BENIcar HCT) 20-12.5 mg tablet    Class 3 severe obesity due to excess calories with serious comorbidity and body mass index (BMI) of 40.0 to 44.9 in adult (CMS/[facility])     E66.01, Z68.41          Assessment/Plan       Yearly physical    Mammogram 1-24  Dexa 11-22 normal  Cologuard +  2-24  CT chest lung cancer screening n/a  GYN n/a  immunizations rev'd UTD  BMI  44.4    Follow up    No complaints    Cologuard positive  Colonoscopy not done yet     rheum following arthritis     pleurisy  basically resolved     Cholelithiasis asymptomatic  Low fat diet discussed     Liver cysts on CT / US   MRI discussed  GI no further workup     Hypertension stable on therapy no side effects     Insomnia stable on therapy no side effects     Allergic rhinitis stable on therapy no side effects     GERD stable on therapy no side effects     Diet and exercise reviewed        Check labs / not done    Follow up 3 months

## 2024-04-16 ENCOUNTER — TELEPHONE (OUTPATIENT)
Dept: PRIMARY CARE | Facility: CLINIC | Age: 64
End: 2024-04-16
Payer: COMMERCIAL

## 2024-04-16 DIAGNOSIS — R19.5 POSITIVE COLORECTAL CANCER SCREENING USING COLOGUARD TEST: Primary | ICD-10-CM

## 2024-04-16 NOTE — TELEPHONE ENCOUNTER
Patient requests that the Gastro referral to Dr. Prasad be changed to Dr. Ferrara.    Please advise how to proceed

## 2024-04-18 ENCOUNTER — TELEPHONE (OUTPATIENT)
Dept: PRIMARY CARE | Facility: CLINIC | Age: 64
End: 2024-04-18
Payer: COMMERCIAL

## 2024-04-18 DIAGNOSIS — R19.5 POSITIVE COLORECTAL CANCER SCREENING USING COLOGUARD TEST: Primary | ICD-10-CM

## 2024-04-18 NOTE — TELEPHONE ENCOUNTER
Patient needs new referral order for colonoscopy, gastro said that the paper said it was just a consult for office visit instead of colonoscopy.

## 2024-04-19 DIAGNOSIS — Z12.11 COLON CANCER SCREENING: ICD-10-CM

## 2024-04-19 RX ORDER — POLYETHYLENE GLYCOL 3350, SODIUM CHLORIDE, SODIUM BICARBONATE, POTASSIUM CHLORIDE 420; 11.2; 5.72; 1.48 G/4L; G/4L; G/4L; G/4L
POWDER, FOR SOLUTION ORAL
Qty: 4000 ML | Refills: 0 | Status: SHIPPED | OUTPATIENT
Start: 2024-04-19

## 2024-04-23 DIAGNOSIS — G47.00 INSOMNIA, UNSPECIFIED TYPE: ICD-10-CM

## 2024-04-23 RX ORDER — HYDROXYZINE PAMOATE 25 MG/1
25 CAPSULE ORAL 2 TIMES DAILY
Qty: 180 CAPSULE | Refills: 0 | Status: SHIPPED | OUTPATIENT
Start: 2024-04-23 | End: 2024-07-22

## 2024-05-29 ENCOUNTER — OFFICE VISIT (OUTPATIENT)
Dept: GASTROENTEROLOGY | Facility: EXTERNAL LOCATION | Age: 64
End: 2024-05-29
Payer: COMMERCIAL

## 2024-05-29 DIAGNOSIS — D12.5 BENIGN NEOPLASM OF SIGMOID COLON: Primary | ICD-10-CM

## 2024-05-29 DIAGNOSIS — R19.5 OTHER FECAL ABNORMALITIES: ICD-10-CM

## 2024-05-29 DIAGNOSIS — Z12.11 ENCOUNTER FOR SCREENING FOR MALIGNANT NEOPLASM OF COLON: ICD-10-CM

## 2024-05-29 DIAGNOSIS — K64.8 INTERNAL HEMORRHOIDS: ICD-10-CM

## 2024-05-29 PROCEDURE — 0753T DGTZ GLS MCRSCP SLD LEVEL IV: CPT

## 2024-05-29 PROCEDURE — 1159F MED LIST DOCD IN RCRD: CPT | Performed by: INTERNAL MEDICINE

## 2024-05-29 PROCEDURE — 45385 COLONOSCOPY W/LESION REMOVAL: CPT | Performed by: INTERNAL MEDICINE

## 2024-05-29 PROCEDURE — 88305 TISSUE EXAM BY PATHOLOGIST: CPT

## 2024-05-29 PROCEDURE — 45381 COLONOSCOPY SUBMUCOUS NJX: CPT | Performed by: INTERNAL MEDICINE

## 2024-05-29 PROCEDURE — 3008F BODY MASS INDEX DOCD: CPT | Performed by: INTERNAL MEDICINE

## 2024-05-29 PROCEDURE — 1160F RVW MEDS BY RX/DR IN RCRD: CPT | Performed by: INTERNAL MEDICINE

## 2024-05-30 ENCOUNTER — LAB REQUISITION (OUTPATIENT)
Dept: LAB | Facility: HOSPITAL | Age: 64
End: 2024-05-30
Payer: COMMERCIAL

## 2024-06-07 LAB
LABORATORY COMMENT REPORT: NORMAL
PATH REPORT.FINAL DX SPEC: NORMAL
PATH REPORT.GROSS SPEC: NORMAL
PATH REPORT.RELEVANT HX SPEC: NORMAL
PATH REPORT.TOTAL CANCER: NORMAL

## 2024-06-07 NOTE — RESULT ENCOUNTER NOTE
The polyp that was removed from your colon was an adenoma (benign but precancerous).  The recommendation is to repeat colonoscopy in 3 years.      Cathy Ferrara MD

## 2024-07-16 DIAGNOSIS — G47.00 INSOMNIA, UNSPECIFIED TYPE: ICD-10-CM

## 2024-07-17 ENCOUNTER — APPOINTMENT (OUTPATIENT)
Dept: PRIMARY CARE | Facility: CLINIC | Age: 64
End: 2024-07-17
Payer: COMMERCIAL

## 2024-07-17 VITALS
DIASTOLIC BLOOD PRESSURE: 76 MMHG | TEMPERATURE: 97.9 F | BODY MASS INDEX: 42.12 KG/M2 | SYSTOLIC BLOOD PRESSURE: 114 MMHG | HEART RATE: 95 BPM | OXYGEN SATURATION: 100 % | WEIGHT: 285.2 LBS

## 2024-07-17 DIAGNOSIS — I10 HYPERTENSION, UNSPECIFIED TYPE: ICD-10-CM

## 2024-07-17 DIAGNOSIS — M19.90 ARTHRITIS: ICD-10-CM

## 2024-07-17 DIAGNOSIS — R73.9 HYPERGLYCEMIA: ICD-10-CM

## 2024-07-17 DIAGNOSIS — E66.01 CLASS 3 SEVERE OBESITY DUE TO EXCESS CALORIES WITH SERIOUS COMORBIDITY AND BODY MASS INDEX (BMI) OF 40.0 TO 44.9 IN ADULT (MULTI): ICD-10-CM

## 2024-07-17 DIAGNOSIS — Z86.010 HISTORY OF COLON POLYPS: ICD-10-CM

## 2024-07-17 DIAGNOSIS — Z71.2 ENCOUNTER TO DISCUSS TEST RESULTS: Primary | ICD-10-CM

## 2024-07-17 PROCEDURE — 3078F DIAST BP <80 MM HG: CPT | Performed by: INTERNAL MEDICINE

## 2024-07-17 PROCEDURE — 99214 OFFICE O/P EST MOD 30 MIN: CPT | Performed by: INTERNAL MEDICINE

## 2024-07-17 PROCEDURE — 1159F MED LIST DOCD IN RCRD: CPT | Performed by: INTERNAL MEDICINE

## 2024-07-17 PROCEDURE — 3074F SYST BP LT 130 MM HG: CPT | Performed by: INTERNAL MEDICINE

## 2024-07-17 PROCEDURE — 1036F TOBACCO NON-USER: CPT | Performed by: INTERNAL MEDICINE

## 2024-07-17 PROCEDURE — 1160F RVW MEDS BY RX/DR IN RCRD: CPT | Performed by: INTERNAL MEDICINE

## 2024-07-17 RX ORDER — HYDROXYZINE PAMOATE 25 MG/1
25 CAPSULE ORAL 2 TIMES DAILY
Qty: 180 CAPSULE | Refills: 0 | Status: SHIPPED | OUTPATIENT
Start: 2024-07-17 | End: 2024-10-15

## 2024-07-17 RX ORDER — OLMESARTAN MEDOXOMIL AND HYDROCHLOROTHIAZIDE 20/12.5 20; 12.5 MG/1; MG/1
1 TABLET ORAL DAILY
Qty: 90 TABLET | Refills: 0 | Status: SHIPPED | OUTPATIENT
Start: 2024-07-17

## 2024-07-17 NOTE — PROGRESS NOTES
"Subjective   Patient ID: Arianne Jolly \"Dionne\" is a 64 y.o. female who presents for 3 month medical management.  HPI    Routine follow up    Labs rev'd     Cologuard positive  Colonoscopy 6-24  polyp recheck 27     rheum following arthritis  Skyrizi pending     pleurisy  basically resolved     Cholelithiasis asymptomatic  Low fat diet discussed     Liver cysts on CT / US   MRI discussed  GI no further workup     Hypertension stable on therapy no side effects     Insomnia stable on therapy no side effects     Allergic rhinitis stable on therapy no side effects     GERD stable on therapy no side effects     Diet and exercise reviewed      Review of Systems   All other systems reviewed and are negative.      Objective   /76   Pulse 95   Temp 36.6 °C (97.9 °F)   Wt 129 kg (285 lb 3.2 oz)   SpO2 100%   BMI 42.12 kg/m²   Dictation #1  MRN:[id number]  CSN:[id number]          Physical Exam  Vitals reviewed.   Constitutional:       Appearance: Normal appearance. She is obese.   HENT:      Head: Normocephalic and atraumatic.      Mouth/Throat:      Pharynx: No posterior oropharyngeal erythema.   Eyes:      General: No scleral icterus.     Conjunctiva/sclera: Conjunctivae normal.      Pupils: Pupils are equal, round, and reactive to light.   Cardiovascular:      Rate and Rhythm: Normal rate and regular rhythm.      Heart sounds: Normal heart sounds.   Pulmonary:      Effort: No respiratory distress.      Breath sounds: No wheezing.   Abdominal:      General: Abdomen is flat. Bowel sounds are normal. There is no distension.      Palpations: Abdomen is soft. There is no mass.      Tenderness: There is no abdominal tenderness. There is no rebound.   Musculoskeletal:         General: Normal range of motion.      Cervical back: Normal range of motion and neck supple.   Skin:     General: Skin is warm and dry.   Neurological:      General: No focal deficit present.      Mental Status: She is alert and oriented to person, " place, and time. Mental status is at baseline.   Psychiatric:         Mood and Affect: Mood normal.         Behavior: Behavior normal.         Thought Content: Thought content normal.         Judgment: Judgment normal.         Problem List Items Addressed This Visit    None  Visit Diagnoses         Codes    Encounter to discuss test results    -  Primary Z71.2    Hyperglycemia     R73.9    Relevant Orders    Hemoglobin A1C    History of colon polyps     Z86.010    Hypertension, unspecified type     I10    Relevant Medications    olmesartan-hydrochlorothiazide (BENIcar HCT) 20-12.5 mg tablet    Arthritis     M19.90    Class 3 severe obesity due to excess calories with serious comorbidity and body mass index (BMI) of 40.0 to 44.9 in adult (Multi)     E66.01, Z68.41          Assessment/Plan     Labs rev'd    Hyperglycemia  on diet  HBA1C 6.0  5-24  Recheck before appt     Cologuard positive  Colonoscopy 6-24  polyp recheck 27     rheum following arthritis  Skyrizi pending     pleurisy  basically resolved     Cholelithiasis asymptomatic  Low fat diet discussed     Liver cysts on CT / US   MRI discussed  GI no further workup     Hypertension stable on therapy no side effects     Insomnia stable on therapy no side effects     Allergic rhinitis stable on therapy no side effects     GERD stable on therapy no side effects     Diet and exercise reviewed      Mammogram 1-24  Dexa 11-22 normal  Cologuard +  2-24  Colonoscopy 6-24  recheck 27 polyp  CT chest lung cancer screening n/a  GYN n/a  immunizations rev'd UTD  BMI  42.1    Follow up 3 months

## 2024-10-08 DIAGNOSIS — G47.00 INSOMNIA, UNSPECIFIED TYPE: ICD-10-CM

## 2024-10-08 RX ORDER — HYDROXYZINE PAMOATE 25 MG/1
25 CAPSULE ORAL 2 TIMES DAILY
Qty: 60 CAPSULE | Refills: 0 | Status: SHIPPED | OUTPATIENT
Start: 2024-10-08 | End: 2024-11-07

## 2024-10-16 ENCOUNTER — APPOINTMENT (OUTPATIENT)
Dept: PRIMARY CARE | Facility: CLINIC | Age: 64
End: 2024-10-16
Payer: COMMERCIAL

## 2024-10-24 ENCOUNTER — LAB (OUTPATIENT)
Dept: LAB | Facility: LAB | Age: 64
End: 2024-10-24
Payer: COMMERCIAL

## 2024-10-24 DIAGNOSIS — R73.9 HYPERGLYCEMIA: ICD-10-CM

## 2024-10-24 LAB
EST. AVERAGE GLUCOSE BLD GHB EST-MCNC: 126 MG/DL
HBA1C MFR BLD: 6 %

## 2024-10-24 PROCEDURE — 36415 COLL VENOUS BLD VENIPUNCTURE: CPT

## 2024-10-24 PROCEDURE — 83036 HEMOGLOBIN GLYCOSYLATED A1C: CPT

## 2024-10-30 ENCOUNTER — APPOINTMENT (OUTPATIENT)
Dept: PRIMARY CARE | Facility: CLINIC | Age: 64
End: 2024-10-30
Payer: COMMERCIAL

## 2024-10-31 ENCOUNTER — APPOINTMENT (OUTPATIENT)
Dept: PRIMARY CARE | Facility: CLINIC | Age: 64
End: 2024-10-31
Payer: COMMERCIAL

## 2024-10-31 VITALS
OXYGEN SATURATION: 91 % | HEART RATE: 78 BPM | DIASTOLIC BLOOD PRESSURE: 82 MMHG | WEIGHT: 282.8 LBS | BODY MASS INDEX: 41.76 KG/M2 | SYSTOLIC BLOOD PRESSURE: 116 MMHG | TEMPERATURE: 97.3 F

## 2024-10-31 DIAGNOSIS — M19.90 ARTHRITIS: ICD-10-CM

## 2024-10-31 DIAGNOSIS — I10 HYPERTENSION, UNSPECIFIED TYPE: ICD-10-CM

## 2024-10-31 DIAGNOSIS — E66.813 CLASS 3 SEVERE OBESITY DUE TO EXCESS CALORIES WITH SERIOUS COMORBIDITY AND BODY MASS INDEX (BMI) OF 40.0 TO 44.9 IN ADULT: ICD-10-CM

## 2024-10-31 DIAGNOSIS — R73.9 HYPERGLYCEMIA: Primary | ICD-10-CM

## 2024-10-31 DIAGNOSIS — K21.9 GASTROESOPHAGEAL REFLUX DISEASE WITHOUT ESOPHAGITIS: ICD-10-CM

## 2024-10-31 DIAGNOSIS — G47.00 INSOMNIA, UNSPECIFIED TYPE: ICD-10-CM

## 2024-10-31 DIAGNOSIS — J30.9 ALLERGIC RHINITIS, UNSPECIFIED SEASONALITY, UNSPECIFIED TRIGGER: ICD-10-CM

## 2024-10-31 DIAGNOSIS — E66.01 CLASS 3 SEVERE OBESITY DUE TO EXCESS CALORIES WITH SERIOUS COMORBIDITY AND BODY MASS INDEX (BMI) OF 40.0 TO 44.9 IN ADULT: ICD-10-CM

## 2024-10-31 PROCEDURE — 1159F MED LIST DOCD IN RCRD: CPT | Performed by: INTERNAL MEDICINE

## 2024-10-31 PROCEDURE — 1036F TOBACCO NON-USER: CPT | Performed by: INTERNAL MEDICINE

## 2024-10-31 PROCEDURE — 3079F DIAST BP 80-89 MM HG: CPT | Performed by: INTERNAL MEDICINE

## 2024-10-31 PROCEDURE — 3074F SYST BP LT 130 MM HG: CPT | Performed by: INTERNAL MEDICINE

## 2024-10-31 PROCEDURE — 1160F RVW MEDS BY RX/DR IN RCRD: CPT | Performed by: INTERNAL MEDICINE

## 2024-10-31 PROCEDURE — 99214 OFFICE O/P EST MOD 30 MIN: CPT | Performed by: INTERNAL MEDICINE

## 2024-10-31 RX ORDER — HYDROXYZINE PAMOATE 25 MG/1
25 CAPSULE ORAL 2 TIMES DAILY
Qty: 180 CAPSULE | Refills: 0 | OUTPATIENT
Start: 2024-10-31 | End: 2025-01-29

## 2024-10-31 RX ORDER — OLMESARTAN MEDOXOMIL AND HYDROCHLOROTHIAZIDE 20/12.5 20; 12.5 MG/1; MG/1
1 TABLET ORAL DAILY
Qty: 90 TABLET | Refills: 0 | Status: SHIPPED | OUTPATIENT
Start: 2024-10-31

## 2024-10-31 RX ORDER — HYDROXYZINE PAMOATE 25 MG/1
25 CAPSULE ORAL 2 TIMES DAILY
Qty: 180 CAPSULE | Refills: 0 | Status: SHIPPED | OUTPATIENT
Start: 2024-10-31 | End: 2025-01-29

## 2025-01-22 ENCOUNTER — APPOINTMENT (OUTPATIENT)
Dept: PRIMARY CARE | Facility: CLINIC | Age: 65
End: 2025-01-22
Payer: COMMERCIAL

## 2025-01-22 ENCOUNTER — LAB (OUTPATIENT)
Dept: LAB | Facility: LAB | Age: 65
End: 2025-01-22
Payer: COMMERCIAL

## 2025-01-22 ENCOUNTER — HOSPITAL ENCOUNTER (OUTPATIENT)
Dept: RADIOLOGY | Facility: HOSPITAL | Age: 65
Discharge: HOME | End: 2025-01-22
Payer: COMMERCIAL

## 2025-01-22 ENCOUNTER — APPOINTMENT (OUTPATIENT)
Dept: RADIOLOGY | Facility: HOSPITAL | Age: 65
End: 2025-01-22
Payer: COMMERCIAL

## 2025-01-22 ENCOUNTER — HOSPITAL ENCOUNTER (EMERGENCY)
Facility: HOSPITAL | Age: 65
Discharge: ANOTHER HOSPITAL | End: 2025-01-23
Attending: STUDENT IN AN ORGANIZED HEALTH CARE EDUCATION/TRAINING PROGRAM
Payer: COMMERCIAL

## 2025-01-22 VITALS
TEMPERATURE: 97.1 F | BODY MASS INDEX: 40.29 KG/M2 | HEART RATE: 99 BPM | SYSTOLIC BLOOD PRESSURE: 102 MMHG | DIASTOLIC BLOOD PRESSURE: 68 MMHG | WEIGHT: 272.8 LBS | OXYGEN SATURATION: 95 %

## 2025-01-22 DIAGNOSIS — Q44.6 POLYCYSTIC LIVER DISEASE: ICD-10-CM

## 2025-01-22 DIAGNOSIS — K83.8 COMMON BILE DUCT DILATATION: ICD-10-CM

## 2025-01-22 DIAGNOSIS — R10.11 RUQ PAIN: ICD-10-CM

## 2025-01-22 DIAGNOSIS — K80.50 CHOLEDOCHOLITHIASIS: ICD-10-CM

## 2025-01-22 DIAGNOSIS — R06.00 DYSPNEA, UNSPECIFIED TYPE: ICD-10-CM

## 2025-01-22 DIAGNOSIS — R10.11 RUQ PAIN: Primary | ICD-10-CM

## 2025-01-22 DIAGNOSIS — K80.20 CALCULUS OF GALLBLADDER WITHOUT CHOLECYSTITIS WITHOUT OBSTRUCTION: ICD-10-CM

## 2025-01-22 DIAGNOSIS — I10 HYPERTENSION, UNSPECIFIED TYPE: ICD-10-CM

## 2025-01-22 DIAGNOSIS — E66.813 CLASS 3 SEVERE OBESITY DUE TO EXCESS CALORIES WITH SERIOUS COMORBIDITY AND BODY MASS INDEX (BMI) OF 40.0 TO 44.9 IN ADULT: ICD-10-CM

## 2025-01-22 DIAGNOSIS — R63.4 ABNORMAL WEIGHT LOSS: ICD-10-CM

## 2025-01-22 DIAGNOSIS — R74.01 TRANSAMINITIS: Primary | ICD-10-CM

## 2025-01-22 DIAGNOSIS — E66.01 CLASS 3 SEVERE OBESITY DUE TO EXCESS CALORIES WITH SERIOUS COMORBIDITY AND BODY MASS INDEX (BMI) OF 40.0 TO 44.9 IN ADULT: ICD-10-CM

## 2025-01-22 LAB
ALBUMIN SERPL BCP-MCNC: 4.1 G/DL (ref 3.4–5)
ALP SERPL-CCNC: 681 U/L (ref 33–136)
ALT SERPL W P-5'-P-CCNC: 395 U/L (ref 7–45)
AMYLASE SERPL-CCNC: 18 U/L (ref 29–103)
ANION GAP SERPL CALC-SCNC: 12 MMOL/L (ref 10–20)
AST SERPL W P-5'-P-CCNC: 226 U/L (ref 9–39)
BASOPHILS # BLD AUTO: 0.06 X10*3/UL (ref 0–0.1)
BASOPHILS NFR BLD AUTO: 0.6 %
BILIRUB SERPL-MCNC: 0.8 MG/DL (ref 0–1.2)
BUN SERPL-MCNC: 22 MG/DL (ref 6–23)
CALCIUM SERPL-MCNC: 9.7 MG/DL (ref 8.6–10.3)
CHLORIDE SERPL-SCNC: 102 MMOL/L (ref 98–107)
CO2 SERPL-SCNC: 28 MMOL/L (ref 21–32)
CREAT SERPL-MCNC: 1.1 MG/DL (ref 0.5–1.05)
EGFRCR SERPLBLD CKD-EPI 2021: 56 ML/MIN/1.73M*2
EOSINOPHIL # BLD AUTO: 0.19 X10*3/UL (ref 0–0.7)
EOSINOPHIL NFR BLD AUTO: 1.9 %
ERYTHROCYTE [DISTWIDTH] IN BLOOD BY AUTOMATED COUNT: 14.9 % (ref 11.5–14.5)
GLUCOSE SERPL-MCNC: 107 MG/DL (ref 74–99)
HCT VFR BLD AUTO: 40.3 % (ref 36–46)
HGB BLD-MCNC: 12.6 G/DL (ref 12–16)
IMM GRANULOCYTES # BLD AUTO: 0.04 X10*3/UL (ref 0–0.7)
IMM GRANULOCYTES NFR BLD AUTO: 0.4 % (ref 0–0.9)
LIPASE SERPL-CCNC: 5 U/L (ref 9–82)
LYMPHOCYTES # BLD AUTO: 1.72 X10*3/UL (ref 1.2–4.8)
LYMPHOCYTES NFR BLD AUTO: 17 %
MCH RBC QN AUTO: 27.1 PG (ref 26–34)
MCHC RBC AUTO-ENTMCNC: 31.3 G/DL (ref 32–36)
MCV RBC AUTO: 87 FL (ref 80–100)
MONOCYTES # BLD AUTO: 0.7 X10*3/UL (ref 0.1–1)
MONOCYTES NFR BLD AUTO: 6.9 %
NEUTROPHILS # BLD AUTO: 7.38 X10*3/UL (ref 1.2–7.7)
NEUTROPHILS NFR BLD AUTO: 73.2 %
NRBC BLD-RTO: 0 /100 WBCS (ref 0–0)
PLATELET # BLD AUTO: 377 X10*3/UL (ref 150–450)
POTASSIUM SERPL-SCNC: 4.2 MMOL/L (ref 3.5–5.3)
PROT SERPL-MCNC: 7.1 G/DL (ref 6.4–8.2)
RBC # BLD AUTO: 4.65 X10*6/UL (ref 4–5.2)
SODIUM SERPL-SCNC: 138 MMOL/L (ref 136–145)
WBC # BLD AUTO: 10.1 X10*3/UL (ref 4.4–11.3)

## 2025-01-22 PROCEDURE — 76376 3D RENDER W/INTRP POSTPROCES: CPT | Performed by: STUDENT IN AN ORGANIZED HEALTH CARE EDUCATION/TRAINING PROGRAM

## 2025-01-22 PROCEDURE — 99214 OFFICE O/P EST MOD 30 MIN: CPT | Performed by: INTERNAL MEDICINE

## 2025-01-22 PROCEDURE — 2550000001 HC RX 255 CONTRASTS: Performed by: STUDENT IN AN ORGANIZED HEALTH CARE EDUCATION/TRAINING PROGRAM

## 2025-01-22 PROCEDURE — A9575 INJ GADOTERATE MEGLUMI 0.1ML: HCPCS | Performed by: STUDENT IN AN ORGANIZED HEALTH CARE EDUCATION/TRAINING PROGRAM

## 2025-01-22 PROCEDURE — 74177 CT ABD & PELVIS W/CONTRAST: CPT

## 2025-01-22 PROCEDURE — 74183 MRI ABD W/O CNTR FLWD CNTR: CPT

## 2025-01-22 PROCEDURE — 3078F DIAST BP <80 MM HG: CPT | Performed by: INTERNAL MEDICINE

## 2025-01-22 PROCEDURE — 71260 CT THORAX DX C+: CPT

## 2025-01-22 PROCEDURE — 1159F MED LIST DOCD IN RCRD: CPT | Performed by: INTERNAL MEDICINE

## 2025-01-22 PROCEDURE — 1036F TOBACCO NON-USER: CPT | Performed by: INTERNAL MEDICINE

## 2025-01-22 PROCEDURE — 99285 EMERGENCY DEPT VISIT HI MDM: CPT | Performed by: STUDENT IN AN ORGANIZED HEALTH CARE EDUCATION/TRAINING PROGRAM

## 2025-01-22 PROCEDURE — 74183 MRI ABD W/O CNTR FLWD CNTR: CPT | Performed by: STUDENT IN AN ORGANIZED HEALTH CARE EDUCATION/TRAINING PROGRAM

## 2025-01-22 PROCEDURE — 1160F RVW MEDS BY RX/DR IN RCRD: CPT | Performed by: INTERNAL MEDICINE

## 2025-01-22 PROCEDURE — 2550000001 HC RX 255 CONTRASTS: Performed by: INTERNAL MEDICINE

## 2025-01-22 PROCEDURE — 3074F SYST BP LT 130 MM HG: CPT | Performed by: INTERNAL MEDICINE

## 2025-01-22 RX ORDER — GADOTERATE MEGLUMINE 376.9 MG/ML
4 INJECTION INTRAVENOUS
Status: COMPLETED | OUTPATIENT
Start: 2025-01-22 | End: 2025-01-22

## 2025-01-22 RX ORDER — GADOTERATE MEGLUMINE 376.9 MG/ML
10 INJECTION INTRAVENOUS
Status: COMPLETED | OUTPATIENT
Start: 2025-01-22 | End: 2025-01-22

## 2025-01-22 RX ADMIN — GADOTERATE MEGLUMINE 10 ML: 376.9 INJECTION INTRAVENOUS at 22:09

## 2025-01-22 RX ADMIN — IOHEXOL 75 ML: 350 INJECTION, SOLUTION INTRAVENOUS at 15:39

## 2025-01-22 RX ADMIN — GADOTERATE MEGLUMINE 4 ML: 376.9 INJECTION INTRAVENOUS at 22:14

## 2025-01-22 ASSESSMENT — PAIN DESCRIPTION - ORIENTATION: ORIENTATION: RIGHT;UPPER

## 2025-01-22 ASSESSMENT — COLUMBIA-SUICIDE SEVERITY RATING SCALE - C-SSRS
1. IN THE PAST MONTH, HAVE YOU WISHED YOU WERE DEAD OR WISHED YOU COULD GO TO SLEEP AND NOT WAKE UP?: NO
6. HAVE YOU EVER DONE ANYTHING, STARTED TO DO ANYTHING, OR PREPARED TO DO ANYTHING TO END YOUR LIFE?: NO
2. HAVE YOU ACTUALLY HAD ANY THOUGHTS OF KILLING YOURSELF?: NO

## 2025-01-22 ASSESSMENT — PAIN DESCRIPTION - LOCATION: LOCATION: ABDOMEN

## 2025-01-22 ASSESSMENT — ENCOUNTER SYMPTOMS
FATIGUE: 1
PAIN: 1
ABDOMINAL PAIN: 1

## 2025-01-22 ASSESSMENT — PAIN - FUNCTIONAL ASSESSMENT: PAIN_FUNCTIONAL_ASSESSMENT: 0-10

## 2025-01-22 ASSESSMENT — PAIN DESCRIPTION - PAIN TYPE: TYPE: ACUTE PAIN

## 2025-01-22 ASSESSMENT — PAIN SCALES - GENERAL
PAINLEVEL_OUTOF10: 2
PAINLEVEL_OUTOF10: 2

## 2025-01-22 NOTE — ED PROVIDER NOTES
CC: Abdominal Pain (Pt to ED following CT scan done earlier today that showed gall stones and was told by PCP to come to ED. Pt with RUQ pain, no nausea, vomiting, diarrhea. No urinary issues, fever, chills, body aches. Pt with trouble with deep breathing as she gets more discomfort in the RUQ. No chest pain. )     HPI:  Patient is a 64-year-old female with a history of hypertension, cholelithiasis and hyperglycemia not on any medications presents to the emergency department after workup that was obtained by her outpatient primary care doctor concerning for dilation of her common bile duct with a transaminitis.  Patient was seen by her primary care doctor today after having 1 month of right flank pain that is worse with movement and deep breathing.  She associates a 10 pound weight loss and nausea but no vomiting.  She has been having normal bowel movements.  She had a workup done today that shows a CT with dilation of the common bile duct and transaminitis with a normal bilirubin.  Patient denies her pain getting worse with food.  She states her father did die of pancreatic cancer.    CT: new biliary ductal dilatation compared to the prior study.  No obvious calcified ductal stone is noted but a noncalcified stone  in the distal duct is possible. There is no pancreatic mass  identified. Ampullary dysfunction or stricture is also consideration.  Consider MRCP.     and .  Reports a history of fatty liver disease however had normal LFTs 1 year ago.  Denies alcohol use     Records Reviewed:  Recent available ED and inpatient notes reviewed in EMR.    PMHx/PSHx:  Per HPI.   - has no past medical history on file.  - has a past surgical history that includes Other surgical history (11/03/2022).  - has Abnormal kidney function; Allergic rhinitis; Mild obstructive sleep apnea; Benign essential hypertension; Decreased GFR; Discoloration of skin of foot; Esophageal reflux; Hyperlipidemia; Insomnia; Lichen  sclerosus et atrophicus of the vulva; Post-menopausal osteoporosis; Shortness of breath; Vertigo; Vitamin D deficiency; Other hyperlipidemia; Age-related nuclear cataract of both eyes; Hematuria; Vitreous floaters of both eyes; Abdominal pain; and Pleurisy on their problem list.    Medications:  Reviewed in EMR. See EMR for complete list of medications and doses.    Allergies:  Cat hair standardized allergenic extract; Varicella-zoster virus glycoprotein e, recombinant; and Weed pollen-dog fennel    Social History:  - Tobacco:  reports that she quit smoking about 32 years ago. Her smoking use included cigarettes. She has never used smokeless tobacco.   - Alcohol:  reports that she does not currently use alcohol.   - Illicit Drugs:  reports that she does not currently use drugs.     ROS:  Per HPI.       ???????????????????????????????????????????????????????????????  Triage Vitals:  T 36.6 °C (97.9 °F)  HR (!) 102  /73  RR 18  O2 95 % None (Room air)    Physical Exam  ???????????????????????????????????????????????????????????????  GEN: no acute distress  HEAD: atraumatic  EYES: no scleral icterus  ENT: mmm  CVS/CHEST: Mild tachycardic heart rate low 100s on my assessment  PULM: CTA b/l no wheezes, crackles, or rhonchi   GI: soft, negative Romero's but does have some tenderness to deep palpation of the right upper quadrant.  Abdomen is otherwise soft without rebound or guarding  EXT: no LE edema, 2+ periph pulses in bilat radial and DP   NEURO: Awake and alert, Strength and sensation is equal in b/l upper and lower extremities  SKIN: warm, dry  PSYCH: AAOx3 answers questions appropriately    Assessment and Plan:  Patient is a 64-year-old female that presents to the emergency department for outpatient abnormal workup including dilated common bile duct and elevated transaminitis.  Will obtain an MRCP for further workup and evaluation regarding the etiology of the dilated common bile duct.  Pending ERCP will  discuss with GI versus general surgery for management.  Patient states the pain is very dull right now.  Declined pain medication at this time.  The pain is worse with breathing.  She is slightly tachycardic to 102.  She is not hypoxic.  She denies shortness of breath.  May be causing irritation to the diaphragm which is causing her to have pain with deep breathing.  A CT with contrast of her chest was done and they did not see an obvious pulmonary embolism but was not done with designation for angiography.  However patient did receive a contrast load today.  If concern persists may consider CT angio tomorrow.  However her symptoms are likely secondary to the etiology that is causing her right upper quadrant discomfort, transaminitis and dilated common bile duct.    MRCP ordered however unable to do it till 930 because patient last ate at 5 PM.  Signed out to oncoming physician pending MRCP results.    ED Course:  Diagnoses as of 01/22/25 2148   Transaminitis   Common bile duct dilatation       Disposition:  Signed out    Sena Mariano DO      Procedures ? [employer] last updated 1/22/2025 9:48 PM        Sena Mariano,   01/22/25 2147       Sena Mariano,   01/22/25 2148

## 2025-01-22 NOTE — ED TRIAGE NOTES
Pt to ED following CT scan done earlier today that showed gall stones and was told by PCP to come to ED. Pt with RUQ pain, no nausea, vomiting, diarrhea. No urinary issues, fever, chills, body aches. Pt with trouble with deep breathing as she gets more discomfort in the RUQ. No chest pain.

## 2025-01-22 NOTE — PROGRESS NOTES
"Subjective   Patient ID: Arianne Jolly \"Dionne\" is a 64 y.o. female who presents for Pain (Sharp pain inside  - right side //Some pain on breast bone patient says ///Since Dec 23 ///Has had this pain before ), Fatigue, and Abdominal Pain (Swollen ).  Pain  Associated symptoms include abdominal pain and fatigue.   Fatigue  Associated symptoms include abdominal pain and fatigue.   Abdominal Pain      Same day sick    RUQ pain sharp radiates to rt flank x 1 month  nausea, weight loss, hematuria x 1   Dyspnea, dry cough  No fever, diarrhea  Check CT chest, abdomen, pelvis and labs rev'd d/w pt to ER asap  Low fat diet  Increase hydration    Cholelithiasis asymptomatic  Low fat diet discussed    Hyperglycemia  on diet  HBA1C 6.0  10-24     Cologuard positive  Colonoscopy 6-24  polyp recheck 27     rheum following arthritis  Skyrizi working     pleurisy  basically resolved     Liver cysts on CT / US   MRI discussed  GI no further workup     Hypertension stable on therapy no side effects     Insomnia stable on therapy no side effects     Allergic rhinitis stable on therapy no side effects     GERD stable on therapy no side effects     Diet and exercise reviewed      Review of Systems   Constitutional:  Positive for fatigue.   Gastrointestinal:  Positive for abdominal pain.   All other systems reviewed and are negative.      Objective   /68   Pulse 99   Temp 36.2 °C (97.1 °F)   Wt 124 kg (272 lb 12.8 oz)   SpO2 95%   BMI 40.29 kg/m²   Lab Results   Component Value Date    WBC 10.1 01/22/2025    HGB 12.6 01/22/2025    HCT 40.3 01/22/2025     01/22/2025    CHOL 181 11/17/2022    TRIG 134 11/17/2022    HDL 55.0 11/17/2022     (H) 01/22/2025     (H) 01/22/2025     01/22/2025    K 4.2 01/22/2025     01/22/2025    CREATININE 1.10 (H) 01/22/2025    BUN 22 01/22/2025    CO2 28 01/22/2025    TSH 2.91 11/17/2022    HGBA1C 6.0 (H) 10/24/2024           Physical Exam  Vitals reviewed. "   Constitutional:       Appearance: She is obese. She is ill-appearing.   HENT:      Head: Normocephalic and atraumatic.      Mouth/Throat:      Pharynx: No posterior oropharyngeal erythema.   Eyes:      General: No scleral icterus.     Conjunctiva/sclera: Conjunctivae normal.      Pupils: Pupils are equal, round, and reactive to light.   Cardiovascular:      Rate and Rhythm: Normal rate and regular rhythm.      Heart sounds: Normal heart sounds.   Pulmonary:      Effort: No respiratory distress.      Breath sounds: No wheezing.   Abdominal:      General: Abdomen is flat. Bowel sounds are normal. There is distension.      Palpations: Abdomen is soft. There is no mass.      Tenderness: There is abdominal tenderness. There is no right CVA tenderness, left CVA tenderness or rebound.   Musculoskeletal:         General: Normal range of motion.      Cervical back: Normal range of motion and neck supple.   Skin:     General: Skin is warm and dry.   Neurological:      General: No focal deficit present.      Mental Status: She is alert and oriented to person, place, and time. Mental status is at baseline.   Psychiatric:         Mood and Affect: Mood normal.         Behavior: Behavior normal.         Thought Content: Thought content normal.         Judgment: Judgment normal.         Problem List Items Addressed This Visit    None  Visit Diagnoses         Codes    RUQ pain    -  Primary R10.11    Relevant Orders    CBC and Auto Differential (Completed)    Comprehensive Metabolic Panel (Completed)    Amylase (Completed)    Lipase (Completed)    Abnormal weight loss     R63.4    Dyspnea, unspecified type     R06.00    Hypertension, unspecified type     I10    Calculus of gallbladder without cholecystitis without obstruction     K80.20    Polycystic liver disease     Q44.6    Class 3 severe obesity due to excess calories with serious comorbidity and body mass index (BMI) of 40.0 to 44.9 in adult     E66.813, E66.01, Z68.41           Assessment/Plan     Same day sick    RUQ pain sharp radiates to rt flank x 1 month  nausea, weight loss, hematuria x 1   Dyspnea, dry cough  No fever, diarrhea  Check CT chest, abdomen, pelvis and labs  Low fat diet  Increase hydration    Cholelithiasis asymptomatic  Low fat diet discussed    Hyperglycemia  on diet  HBA1C 6.0  10-24     Cologuard positive  Colonoscopy 6-24  polyp recheck 27     rheum following arthritis  Skyrizi working     pleurisy  basically resolved     Liver cysts on CT / US   MRI discussed  GI no further workup     Hypertension stable on therapy no side effects     Insomnia stable on therapy no side effects     Allergic rhinitis stable on therapy no side effects     GERD stable on therapy no side effects     Diet and exercise reviewed      Mammogram 1-24  Dexa 11-22 normal  Cologuard +  2-24  Colonoscopy 6-24  recheck 27 polyp  CT chest lung cancer screening n/a  GYN n/a  immunizations rev'd UTD  BMI  40.2    Follow up as scheduled

## 2025-01-23 ENCOUNTER — APPOINTMENT (OUTPATIENT)
Dept: GASTROENTEROLOGY | Facility: HOSPITAL | Age: 65
End: 2025-01-23
Payer: COMMERCIAL

## 2025-01-23 ENCOUNTER — ANESTHESIA EVENT (OUTPATIENT)
Dept: GASTROENTEROLOGY | Facility: HOSPITAL | Age: 65
End: 2025-01-23
Payer: COMMERCIAL

## 2025-01-23 ENCOUNTER — ANESTHESIA (OUTPATIENT)
Dept: GASTROENTEROLOGY | Facility: HOSPITAL | Age: 65
End: 2025-01-23
Payer: COMMERCIAL

## 2025-01-23 ENCOUNTER — APPOINTMENT (OUTPATIENT)
Dept: RADIOLOGY | Facility: HOSPITAL | Age: 65
End: 2025-01-23
Payer: COMMERCIAL

## 2025-01-23 ENCOUNTER — HOSPITAL ENCOUNTER (INPATIENT)
Facility: HOSPITAL | Age: 65
LOS: 1 days | Discharge: HOME | End: 2025-01-24
Attending: INTERNAL MEDICINE | Admitting: INTERNAL MEDICINE
Payer: COMMERCIAL

## 2025-01-23 VITALS
HEART RATE: 70 BPM | WEIGHT: 270 LBS | TEMPERATURE: 98.6 F | SYSTOLIC BLOOD PRESSURE: 125 MMHG | HEIGHT: 68 IN | OXYGEN SATURATION: 97 % | RESPIRATION RATE: 16 BRPM | BODY MASS INDEX: 40.92 KG/M2 | DIASTOLIC BLOOD PRESSURE: 73 MMHG

## 2025-01-23 DIAGNOSIS — K80.50 CHOLEDOCHOLITHIASIS: Primary | ICD-10-CM

## 2025-01-23 PROBLEM — E66.9 OBESITY: Status: ACTIVE | Noted: 2025-01-23

## 2025-01-23 PROCEDURE — 1100000001 HC PRIVATE ROOM DAILY

## 2025-01-23 PROCEDURE — 96365 THER/PROPH/DIAG IV INF INIT: CPT

## 2025-01-23 PROCEDURE — 0FC78ZZ EXTIRPATION OF MATTER FROM COMMON HEPATIC DUCT, VIA NATURAL OR ARTIFICIAL OPENING ENDOSCOPIC: ICD-10-PCS | Performed by: INTERNAL MEDICINE

## 2025-01-23 PROCEDURE — 2500000002 HC RX 250 W HCPCS SELF ADMINISTERED DRUGS (ALT 637 FOR MEDICARE OP, ALT 636 FOR OP/ED): Performed by: INTERNAL MEDICINE

## 2025-01-23 PROCEDURE — 2500000004 HC RX 250 GENERAL PHARMACY W/ HCPCS (ALT 636 FOR OP/ED): Performed by: INTERNAL MEDICINE

## 2025-01-23 PROCEDURE — 7100000001 HC RECOVERY ROOM TIME - INITIAL BASE CHARGE

## 2025-01-23 PROCEDURE — 2500000005 HC RX 250 GENERAL PHARMACY W/O HCPCS: Performed by: INTERNAL MEDICINE

## 2025-01-23 PROCEDURE — C1726 CATH, BAL DIL, NON-VASCULAR: HCPCS

## 2025-01-23 PROCEDURE — 99223 1ST HOSP IP/OBS HIGH 75: CPT | Performed by: NURSE PRACTITIONER

## 2025-01-23 PROCEDURE — 2500000004 HC RX 250 GENERAL PHARMACY W/ HCPCS (ALT 636 FOR OP/ED): Performed by: STUDENT IN AN ORGANIZED HEALTH CARE EDUCATION/TRAINING PROGRAM

## 2025-01-23 PROCEDURE — 2500000004 HC RX 250 GENERAL PHARMACY W/ HCPCS (ALT 636 FOR OP/ED): Performed by: REGISTERED NURSE

## 2025-01-23 PROCEDURE — 96366 THER/PROPH/DIAG IV INF ADDON: CPT

## 2025-01-23 PROCEDURE — 3700000001 HC GENERAL ANESTHESIA TIME - INITIAL BASE CHARGE

## 2025-01-23 PROCEDURE — C1769 GUIDE WIRE: HCPCS

## 2025-01-23 PROCEDURE — 43264 ERCP REMOVE DUCT CALCULI: CPT | Performed by: INTERNAL MEDICINE

## 2025-01-23 PROCEDURE — 7100000002 HC RECOVERY ROOM TIME - EACH INCREMENTAL 1 MINUTE

## 2025-01-23 PROCEDURE — 2500000001 HC RX 250 WO HCPCS SELF ADMINISTERED DRUGS (ALT 637 FOR MEDICARE OP): Performed by: INTERNAL MEDICINE

## 2025-01-23 PROCEDURE — 43262 ENDO CHOLANGIOPANCREATOGRAPH: CPT | Performed by: INTERNAL MEDICINE

## 2025-01-23 PROCEDURE — 3700000002 HC GENERAL ANESTHESIA TIME - EACH INCREMENTAL 1 MINUTE

## 2025-01-23 PROCEDURE — 99223 1ST HOSP IP/OBS HIGH 75: CPT | Performed by: INTERNAL MEDICINE

## 2025-01-23 PROCEDURE — 2500000005 HC RX 250 GENERAL PHARMACY W/O HCPCS: Performed by: ANESTHESIOLOGY

## 2025-01-23 PROCEDURE — 2720000007 HC OR 272 NO HCPCS

## 2025-01-23 PROCEDURE — 2550000001 HC RX 255 CONTRASTS: Performed by: INTERNAL MEDICINE

## 2025-01-23 RX ORDER — LIDOCAINE HYDROCHLORIDE 10 MG/ML
0.1 INJECTION, SOLUTION EPIDURAL; INFILTRATION; INTRACAUDAL; PERINEURAL ONCE
Status: DISCONTINUED | OUTPATIENT
Start: 2025-01-23 | End: 2025-01-24 | Stop reason: HOSPADM

## 2025-01-23 RX ORDER — INDOMETHACIN 100 MG
SUPPOSITORY, RECTAL RECTAL AS NEEDED
Status: DISCONTINUED | OUTPATIENT
Start: 2025-01-23 | End: 2025-01-23 | Stop reason: HOSPADM

## 2025-01-23 RX ORDER — LIDOCAINE HYDROCHLORIDE 20 MG/ML
INJECTION, SOLUTION EPIDURAL; INFILTRATION; INTRACAUDAL; PERINEURAL AS NEEDED
Status: DISCONTINUED | OUTPATIENT
Start: 2025-01-23 | End: 2025-01-23

## 2025-01-23 RX ORDER — ONDANSETRON HYDROCHLORIDE 2 MG/ML
4 INJECTION, SOLUTION INTRAVENOUS EVERY 8 HOURS PRN
Status: DISCONTINUED | OUTPATIENT
Start: 2025-01-23 | End: 2025-01-24 | Stop reason: HOSPADM

## 2025-01-23 RX ORDER — PANTOPRAZOLE SODIUM 40 MG/10ML
40 INJECTION, POWDER, LYOPHILIZED, FOR SOLUTION INTRAVENOUS DAILY
Status: DISCONTINUED | OUTPATIENT
Start: 2025-01-23 | End: 2025-01-24 | Stop reason: HOSPADM

## 2025-01-23 RX ORDER — ACETAMINOPHEN 160 MG/5ML
650 SOLUTION ORAL EVERY 4 HOURS PRN
Status: DISCONTINUED | OUTPATIENT
Start: 2025-01-23 | End: 2025-01-24 | Stop reason: HOSPADM

## 2025-01-23 RX ORDER — ROCURONIUM BROMIDE 10 MG/ML
INJECTION, SOLUTION INTRAVENOUS AS NEEDED
Status: DISCONTINUED | OUTPATIENT
Start: 2025-01-23 | End: 2025-01-23

## 2025-01-23 RX ORDER — SODIUM CHLORIDE, SODIUM LACTATE, POTASSIUM CHLORIDE, CALCIUM CHLORIDE 600; 310; 30; 20 MG/100ML; MG/100ML; MG/100ML; MG/100ML
100 INJECTION, SOLUTION INTRAVENOUS CONTINUOUS
Status: ACTIVE | OUTPATIENT
Start: 2025-01-23 | End: 2025-01-24

## 2025-01-23 RX ORDER — ONDANSETRON HYDROCHLORIDE 2 MG/ML
4 INJECTION, SOLUTION INTRAVENOUS ONCE AS NEEDED
Status: DISCONTINUED | OUTPATIENT
Start: 2025-01-23 | End: 2025-01-24 | Stop reason: HOSPADM

## 2025-01-23 RX ORDER — CHOLECALCIFEROL (VITAMIN D3) 25 MCG
5000 TABLET ORAL DAILY
Status: DISCONTINUED | OUTPATIENT
Start: 2025-01-23 | End: 2025-01-24 | Stop reason: HOSPADM

## 2025-01-23 RX ORDER — FENTANYL CITRATE 50 UG/ML
INJECTION, SOLUTION INTRAMUSCULAR; INTRAVENOUS AS NEEDED
Status: DISCONTINUED | OUTPATIENT
Start: 2025-01-23 | End: 2025-01-23

## 2025-01-23 RX ORDER — ACETAMINOPHEN 325 MG/1
650 TABLET ORAL EVERY 4 HOURS PRN
Status: DISCONTINUED | OUTPATIENT
Start: 2025-01-23 | End: 2025-01-24 | Stop reason: HOSPADM

## 2025-01-23 RX ORDER — PROPOFOL 10 MG/ML
INJECTION, EMULSION INTRAVENOUS AS NEEDED
Status: DISCONTINUED | OUTPATIENT
Start: 2025-01-23 | End: 2025-01-23

## 2025-01-23 RX ORDER — MIDAZOLAM HYDROCHLORIDE 1 MG/ML
INJECTION INTRAMUSCULAR; INTRAVENOUS AS NEEDED
Status: DISCONTINUED | OUTPATIENT
Start: 2025-01-23 | End: 2025-01-23

## 2025-01-23 RX ORDER — ACETAMINOPHEN 650 MG/1
650 SUPPOSITORY RECTAL EVERY 4 HOURS PRN
Status: DISCONTINUED | OUTPATIENT
Start: 2025-01-23 | End: 2025-01-24 | Stop reason: HOSPADM

## 2025-01-23 RX ORDER — HYDROMORPHONE HYDROCHLORIDE 0.2 MG/ML
0.2 INJECTION INTRAMUSCULAR; INTRAVENOUS; SUBCUTANEOUS EVERY 4 HOURS PRN
Status: DISCONTINUED | OUTPATIENT
Start: 2025-01-23 | End: 2025-01-24 | Stop reason: HOSPADM

## 2025-01-23 RX ORDER — OXYCODONE HYDROCHLORIDE 5 MG/1
5 TABLET ORAL EVERY 4 HOURS PRN
Status: DISCONTINUED | OUTPATIENT
Start: 2025-01-23 | End: 2025-01-24 | Stop reason: HOSPADM

## 2025-01-23 RX ORDER — ONDANSETRON HYDROCHLORIDE 2 MG/ML
4 INJECTION, SOLUTION INTRAVENOUS ONCE AS NEEDED
Status: CANCELLED | OUTPATIENT
Start: 2025-01-23

## 2025-01-23 RX ORDER — PHENYLEPHRINE HCL IN 0.9% NACL 1 MG/10 ML
SYRINGE (ML) INTRAVENOUS AS NEEDED
Status: DISCONTINUED | OUTPATIENT
Start: 2025-01-23 | End: 2025-01-23

## 2025-01-23 RX ORDER — MEPERIDINE HYDROCHLORIDE 25 MG/ML
12.5 INJECTION INTRAMUSCULAR; INTRAVENOUS; SUBCUTANEOUS EVERY 10 MIN PRN
Status: CANCELLED | OUTPATIENT
Start: 2025-01-23

## 2025-01-23 RX ORDER — LIDOCAINE HYDROCHLORIDE 10 MG/ML
0.1 INJECTION, SOLUTION EPIDURAL; INFILTRATION; INTRACAUDAL; PERINEURAL ONCE
Status: CANCELLED | OUTPATIENT
Start: 2025-01-23 | End: 2025-01-23

## 2025-01-23 RX ORDER — BENZONATATE 100 MG/1
100 CAPSULE ORAL 3 TIMES DAILY PRN
Status: DISCONTINUED | OUTPATIENT
Start: 2025-01-23 | End: 2025-01-24 | Stop reason: HOSPADM

## 2025-01-23 RX ORDER — OXYCODONE HYDROCHLORIDE 5 MG/1
5 TABLET ORAL EVERY 4 HOURS PRN
Status: CANCELLED | OUTPATIENT
Start: 2025-01-23

## 2025-01-23 RX ORDER — HYDROMORPHONE HYDROCHLORIDE 0.2 MG/ML
0.2 INJECTION INTRAMUSCULAR; INTRAVENOUS; SUBCUTANEOUS EVERY 5 MIN PRN
Status: DISCONTINUED | OUTPATIENT
Start: 2025-01-23 | End: 2025-01-24 | Stop reason: HOSPADM

## 2025-01-23 RX ORDER — ONDANSETRON 4 MG/1
4 TABLET, FILM COATED ORAL EVERY 8 HOURS PRN
Status: DISCONTINUED | OUTPATIENT
Start: 2025-01-23 | End: 2025-01-24 | Stop reason: HOSPADM

## 2025-01-23 RX ORDER — HYDROXYZINE PAMOATE 25 MG/1
25 CAPSULE ORAL 2 TIMES DAILY
Status: DISCONTINUED | OUTPATIENT
Start: 2025-01-23 | End: 2025-01-24 | Stop reason: HOSPADM

## 2025-01-23 RX ORDER — ALLOPURINOL 100 MG/1
100 TABLET ORAL DAILY
Status: DISCONTINUED | OUTPATIENT
Start: 2025-01-23 | End: 2025-01-24 | Stop reason: HOSPADM

## 2025-01-23 RX ORDER — POLYETHYLENE GLYCOL 3350 17 G/17G
17 POWDER, FOR SOLUTION ORAL DAILY
Status: DISCONTINUED | OUTPATIENT
Start: 2025-01-23 | End: 2025-01-24 | Stop reason: HOSPADM

## 2025-01-23 RX ORDER — ENOXAPARIN SODIUM 100 MG/ML
40 INJECTION SUBCUTANEOUS EVERY 12 HOURS SCHEDULED
Status: DISCONTINUED | OUTPATIENT
Start: 2025-01-23 | End: 2025-01-24 | Stop reason: HOSPADM

## 2025-01-23 RX ADMIN — LIDOCAINE HYDROCHLORIDE 100 MG: 20 INJECTION, SOLUTION EPIDURAL; INFILTRATION; INTRACAUDAL; PERINEURAL at 16:00

## 2025-01-23 RX ADMIN — HYDROXYZINE PAMOATE 25 MG: 25 CAPSULE ORAL at 20:21

## 2025-01-23 RX ADMIN — ENOXAPARIN SODIUM 40 MG: 40 INJECTION SUBCUTANEOUS at 20:21

## 2025-01-23 RX ADMIN — Medication 200 MCG: at 16:09

## 2025-01-23 RX ADMIN — PIPERACILLIN SODIUM AND TAZOBACTAM SODIUM 3.38 G: 3; .375 INJECTION, SOLUTION INTRAVENOUS at 02:14

## 2025-01-23 RX ADMIN — MIDAZOLAM HYDROCHLORIDE 2 MG: 1 INJECTION, SOLUTION INTRAMUSCULAR; INTRAVENOUS at 15:51

## 2025-01-23 RX ADMIN — ALLOPURINOL 100 MG: 100 TABLET ORAL at 11:58

## 2025-01-23 RX ADMIN — BENZOCAINE AND MENTHOL 1 LOZENGE: 15; 3.6 LOZENGE ORAL at 18:37

## 2025-01-23 RX ADMIN — Medication 6 L/MIN: at 16:42

## 2025-01-23 RX ADMIN — SODIUM CHLORIDE, POTASSIUM CHLORIDE, SODIUM LACTATE AND CALCIUM CHLORIDE 100 ML/HR: 600; 310; 30; 20 INJECTION, SOLUTION INTRAVENOUS at 12:00

## 2025-01-23 RX ADMIN — BENZONATATE 100 MG: 100 CAPSULE ORAL at 11:56

## 2025-01-23 RX ADMIN — Medication 200 MCG: at 16:15

## 2025-01-23 RX ADMIN — SODIUM CHLORIDE, POTASSIUM CHLORIDE, SODIUM LACTATE AND CALCIUM CHLORIDE: 600; 310; 30; 20 INJECTION, SOLUTION INTRAVENOUS at 15:51

## 2025-01-23 RX ADMIN — DEXAMETHASONE SODIUM PHOSPHATE 4 MG: 4 INJECTION, SOLUTION INTRA-ARTICULAR; INTRALESIONAL; INTRAMUSCULAR; INTRAVENOUS; SOFT TISSUE at 16:05

## 2025-01-23 RX ADMIN — ROCURONIUM BROMIDE 50 MG: 10 INJECTION, SOLUTION INTRAVENOUS at 16:01

## 2025-01-23 RX ADMIN — SODIUM CHLORIDE 1000 ML: 9 INJECTION, SOLUTION INTRAVENOUS at 02:14

## 2025-01-23 RX ADMIN — Medication 100 MG: at 16:08

## 2025-01-23 RX ADMIN — IOHEXOL 10 ML: 350 INJECTION, SOLUTION INTRAVENOUS at 16:35

## 2025-01-23 RX ADMIN — PROPOFOL 170 MG: 10 INJECTION, EMULSION INTRAVENOUS at 16:00

## 2025-01-23 RX ADMIN — ONDANSETRON 4 MG: 2 INJECTION INTRAMUSCULAR; INTRAVENOUS at 16:33

## 2025-01-23 RX ADMIN — PANTOPRAZOLE SODIUM 40 MG: 40 INJECTION, POWDER, FOR SOLUTION INTRAVENOUS at 12:03

## 2025-01-23 RX ADMIN — FENTANYL CITRATE 100 MCG: 50 INJECTION, SOLUTION INTRAMUSCULAR; INTRAVENOUS at 16:00

## 2025-01-23 RX ADMIN — SUGAMMADEX 250 MG: 100 INJECTION, SOLUTION INTRAVENOUS at 16:36

## 2025-01-23 RX ADMIN — Medication 200 MCG: at 16:24

## 2025-01-23 SDOH — SOCIAL STABILITY: SOCIAL INSECURITY: WITHIN THE LAST YEAR, HAVE YOU BEEN AFRAID OF YOUR PARTNER OR EX-PARTNER?: NO

## 2025-01-23 SDOH — ECONOMIC STABILITY: INCOME INSECURITY: IN THE PAST 12 MONTHS HAS THE ELECTRIC, GAS, OIL, OR WATER COMPANY THREATENED TO SHUT OFF SERVICES IN YOUR HOME?: NO

## 2025-01-23 SDOH — SOCIAL STABILITY: SOCIAL INSECURITY: ABUSE: ADULT

## 2025-01-23 SDOH — SOCIAL STABILITY: SOCIAL INSECURITY: WITHIN THE LAST YEAR, HAVE YOU BEEN HUMILIATED OR EMOTIONALLY ABUSED IN OTHER WAYS BY YOUR PARTNER OR EX-PARTNER?: NO

## 2025-01-23 SDOH — ECONOMIC STABILITY: FOOD INSECURITY: WITHIN THE PAST 12 MONTHS, YOU WORRIED THAT YOUR FOOD WOULD RUN OUT BEFORE YOU GOT THE MONEY TO BUY MORE.: NEVER TRUE

## 2025-01-23 SDOH — ECONOMIC STABILITY: FOOD INSECURITY: WITHIN THE PAST 12 MONTHS, THE FOOD YOU BOUGHT JUST DIDN'T LAST AND YOU DIDN'T HAVE MONEY TO GET MORE.: NEVER TRUE

## 2025-01-23 SDOH — SOCIAL STABILITY: SOCIAL INSECURITY
WITHIN THE LAST YEAR, HAVE YOU BEEN KICKED, HIT, SLAPPED, OR OTHERWISE PHYSICALLY HURT BY YOUR PARTNER OR EX-PARTNER?: NO

## 2025-01-23 SDOH — SOCIAL STABILITY: SOCIAL INSECURITY: HAVE YOU HAD ANY THOUGHTS OF HARMING ANYONE ELSE?: NO

## 2025-01-23 SDOH — HEALTH STABILITY: MENTAL HEALTH: HOW OFTEN DO YOU HAVE A DRINK CONTAINING ALCOHOL?: NEVER

## 2025-01-23 SDOH — SOCIAL STABILITY: SOCIAL INSECURITY: DO YOU FEEL UNSAFE GOING BACK TO THE PLACE WHERE YOU ARE LIVING?: NO

## 2025-01-23 SDOH — SOCIAL STABILITY: SOCIAL INSECURITY
WITHIN THE LAST YEAR, HAVE YOU BEEN RAPED OR FORCED TO HAVE ANY KIND OF SEXUAL ACTIVITY BY YOUR PARTNER OR EX-PARTNER?: NO

## 2025-01-23 SDOH — SOCIAL STABILITY: SOCIAL INSECURITY: ARE YOU OR HAVE YOU BEEN THREATENED OR ABUSED PHYSICALLY, EMOTIONALLY, OR SEXUALLY BY ANYONE?: NO

## 2025-01-23 SDOH — HEALTH STABILITY: MENTAL HEALTH: HOW OFTEN DO YOU HAVE SIX OR MORE DRINKS ON ONE OCCASION?: NEVER

## 2025-01-23 SDOH — SOCIAL STABILITY: SOCIAL INSECURITY: HAS ANYONE EVER THREATENED TO HURT YOUR FAMILY OR YOUR PETS?: NO

## 2025-01-23 SDOH — SOCIAL STABILITY: SOCIAL INSECURITY: ARE THERE ANY APPARENT SIGNS OF INJURIES/BEHAVIORS THAT COULD BE RELATED TO ABUSE/NEGLECT?: NO

## 2025-01-23 SDOH — SOCIAL STABILITY: SOCIAL INSECURITY: DOES ANYONE TRY TO KEEP YOU FROM HAVING/CONTACTING OTHER FRIENDS OR DOING THINGS OUTSIDE YOUR HOME?: NO

## 2025-01-23 SDOH — SOCIAL STABILITY: SOCIAL INSECURITY: HAVE YOU HAD THOUGHTS OF HARMING ANYONE ELSE?: NO

## 2025-01-23 SDOH — SOCIAL STABILITY: SOCIAL INSECURITY: WERE YOU ABLE TO COMPLETE ALL THE BEHAVIORAL HEALTH SCREENINGS?: YES

## 2025-01-23 SDOH — HEALTH STABILITY: MENTAL HEALTH: HOW MANY DRINKS CONTAINING ALCOHOL DO YOU HAVE ON A TYPICAL DAY WHEN YOU ARE DRINKING?: PATIENT DOES NOT DRINK

## 2025-01-23 SDOH — SOCIAL STABILITY: SOCIAL INSECURITY: DO YOU FEEL ANYONE HAS EXPLOITED OR TAKEN ADVANTAGE OF YOU FINANCIALLY OR OF YOUR PERSONAL PROPERTY?: NO

## 2025-01-23 ASSESSMENT — COGNITIVE AND FUNCTIONAL STATUS - GENERAL
DAILY ACTIVITIY SCORE: 24
DAILY ACTIVITIY SCORE: 24
PATIENT BASELINE BEDBOUND: NO
MOBILITY SCORE: 24
MOBILITY SCORE: 24

## 2025-01-23 ASSESSMENT — PAIN - FUNCTIONAL ASSESSMENT
PAIN_FUNCTIONAL_ASSESSMENT: 0-10

## 2025-01-23 ASSESSMENT — COLUMBIA-SUICIDE SEVERITY RATING SCALE - C-SSRS
2. HAVE YOU ACTUALLY HAD ANY THOUGHTS OF KILLING YOURSELF?: NO
6. HAVE YOU EVER DONE ANYTHING, STARTED TO DO ANYTHING, OR PREPARED TO DO ANYTHING TO END YOUR LIFE?: NO
1. IN THE PAST MONTH, HAVE YOU WISHED YOU WERE DEAD OR WISHED YOU COULD GO TO SLEEP AND NOT WAKE UP?: NO

## 2025-01-23 ASSESSMENT — LIFESTYLE VARIABLES
AUDIT-C TOTAL SCORE: 0
SKIP TO QUESTIONS 9-10: 1

## 2025-01-23 ASSESSMENT — ACTIVITIES OF DAILY LIVING (ADL)
JUDGMENT_ADEQUATE_SAFELY_COMPLETE_DAILY_ACTIVITIES: YES
GROOMING: INDEPENDENT
HEARING - RIGHT EAR: DIFFICULTY WITH NOISE
TOILETING: INDEPENDENT
FEEDING YOURSELF: INDEPENDENT
ADEQUATE_TO_COMPLETE_ADL: YES
PATIENT'S MEMORY ADEQUATE TO SAFELY COMPLETE DAILY ACTIVITIES?: YES
WALKS IN HOME: INDEPENDENT
HEARING - LEFT EAR: DIFFICULTY WITH NOISE
BATHING: INDEPENDENT
LACK_OF_TRANSPORTATION: NO
DRESSING YOURSELF: INDEPENDENT

## 2025-01-23 ASSESSMENT — PAIN SCALES - GENERAL
PAINLEVEL_OUTOF10: 0 - NO PAIN
PAINLEVEL_OUTOF10: 2
PAINLEVEL_OUTOF10: 0 - NO PAIN

## 2025-01-23 ASSESSMENT — PATIENT HEALTH QUESTIONNAIRE - PHQ9
2. FEELING DOWN, DEPRESSED OR HOPELESS: NOT AT ALL
SUM OF ALL RESPONSES TO PHQ9 QUESTIONS 1 & 2: 0
1. LITTLE INTEREST OR PLEASURE IN DOING THINGS: NOT AT ALL

## 2025-01-23 NOTE — ANESTHESIA POSTPROCEDURE EVALUATION
"Patient: Arianne Jolly \"Dionne\"    Procedure Summary       Date: 01/23/25 Room / Location: [facility]    Anesthesia Start: 1551 Anesthesia Stop: 1646    Procedure: ERCP Diagnosis: Choledocholithiasis    Scheduled Providers: Anthony Walker DO; Edgar Ragsdale MD Responsible Provider: Gama Sandhu MD    Anesthesia Type: general ASA Status: 3 - Emergent            Anesthesia Type: general    Vitals Value Taken Time   /68 01/23/25 1715   Temp 36.7 °C (98.1 °F) 01/23/25 1642   Pulse 70 01/23/25 1715   Resp 12 01/23/25 1715   SpO2 95 % 01/23/25 1715       Anesthesia Post Evaluation    Patient participation: complete - patient participated  Level of consciousness: awake  Pain management: satisfactory to patient  Airway patency: patent  Cardiovascular status: acceptable and hemodynamically stable  Respiratory status: acceptable and nonlabored ventilation  Hydration status: balanced  Postoperative Nausea and Vomiting: none        No notable events documented.    "

## 2025-01-23 NOTE — CONSULTS
Reason For Consult  Choledocholithiasis with dilated biliary duct    History Of Present Illness  Dionne Jolly is a 64 y.o. female with h/o hypertension, morbid obesity, hyperlipidemia, GERD, hepatic and kidney cysts, hepatic steatosis, osteoporosis from outside facility due to choledocholithiasis, dilated CBD for ERCP. patient reports right upper quadrant abdominal pain has been going on for the past month or so, intermittent sharp pain, no aggravating factors, can happen after eating and regardless of meals.  Not associated with nausea or vomiting, reports poor appetite and 10 pound weight loss.  She reported hip pain got worse yesterday and she could not handle it and came to ED.  CT on admission showed biliary ductal dilation without obvious stones or masses.  She had MRCP done that showed choledocholithiasis in the ampulla with moderate diffuse intra and extrahepatic biliary ductal dilation cholelithiasis without evidence of acute cholecystitis, numerous hepatic and renal cyst.  See report below.  She was transferred here for further management and ERCP.  Labs showed no leukocytosis, normal H&H, platelets 377, alk phos 681, , , total bilirubin 0.8 lipase 5, amylase 18, BUN 22, creatinine 1.10.    She reports history of liver cysts, was seen by hepatology at [facility] recommended FibroScan, weight loss, follow-up.    Past Medical History  She has no past medical history on file.    Surgical History  She has a past surgical history that includes Other surgical history (11/03/2022).     Social History  She reports that she quit smoking about 32 years ago. Her smoking use included cigarettes. She has never used smokeless tobacco. She reports that she does not currently use alcohol. She reports that she does not currently use drugs.    Family History  No family history on file.     Allergies  Cat hair standardized allergenic extract; Varicella-zoster virus glycoprotein e, recombinant; and Weed pollen-dog  fennel    Review of Systems  10 systems reviewed and negative other than HPI     Physical Exam  Physical Exam  Constitutional:       Appearance: Normal appearance. She is obese.   HENT:      Head: Normocephalic and atraumatic.   Cardiovascular:      Rate and Rhythm: Normal rate and regular rhythm.      Heart sounds: Normal heart sounds.   Pulmonary:      Effort: Pulmonary effort is normal.      Breath sounds: Normal breath sounds.   Abdominal:      General: Bowel sounds are normal. There is no distension.      Palpations: Abdomen is soft.      Tenderness: There is abdominal tenderness in the right upper quadrant. There is guarding.   Skin:     General: Skin is warm and dry.   Neurological:      General: No focal deficit present.      Mental Status: She is alert and oriented to person, place, and time.   Psychiatric:         Mood and Affect: Mood normal.         Behavior: Behavior normal.              Last Recorded Vitals  Blood pressure 127/75, pulse 75, temperature 36.9 °C (98.5 °F), resp. rate 17, SpO2 96%.    Relevant Results      Scheduled medications  allopurinol, 100 mg, oral, Daily  cholecalciferol, 5,000 Units, oral, Daily  enoxaparin, 40 mg, subcutaneous, q12h KIMO  hydrOXYzine pamoate, 25 mg, oral, BID  pantoprazole, 40 mg, intravenous, Daily  polyethylene glycol, 17 g, oral, Daily  valsartan 160 mg, hydroCHLOROthiazide 12.5 mg for Diovan HCT, , oral, Daily      Continuous medications  lactated Ringer's, 100 mL/hr, Last Rate: 100 mL/hr (01/23/25 1200)      PRN medications  PRN medications: acetaminophen **OR** acetaminophen **OR** acetaminophen, benzonatate, HYDROmorphone, HYDROmorphone, ondansetron **OR** ondansetron  MRCP pancreas w and wo IV contrast    Result Date: 1/23/2025  Interpreted By:  Percy English, STUDY: MRCP PANCREAS W AND WO IV CONTRAST;  1/22/2025 10:32 pm   INDICATION: Signs/Symptoms:concern for CBD obstruction with transaminitis and abnormal CT.     COMPARISON: CT chest abdomen pelvis  01/22/2025   ACCESSION NUMBER(S): [id number]   ORDERING CLINICIAN: MOHIT BERRY   TECHNIQUE: Multiplanar, multi-sequence images of the abdomen were obtained before and after the intravenous administration of 10 mL gadolinium. Magnetic resonance cholangiopancreatography (MRCP) sequences were obtained with maximum intensity projection images (MIP).   FINDINGS: LIVER: There are multiple nonenhancing hepatic cysts, the majority of which are simple in nature, the largest measuring 5.9 cm in segment 7. There are several nonenhancing T1 hyperintense cysts in the left lobe measuring up to 2.1 cm in segment 2 (axial image 30, series 9) likely representing proteinaceous or hemorrhagic cysts. No suspicious enhancing mass. No discernable signal dropout on opposed phase images to suggest significant fatty infiltration.   BILE DUCTS: There is a 0.8 cm x 0.8 cm stone at the ampulla (coronal T2WI 18, series 3). There is upstream dilatation of the common bile duct measuring 1.3 cm at the pancreatic head 1.6 cm just distal to the cystic duct. The common, right, left hepatic ducts are dilated and there is diffuse moderate intrahepatic biliary ductal dilatation. There is no biliary mass.   GALLBLADDER: Cholelithiasis. No discernible gallbladder wall thickening, pericholecystic fluid, or stranding.   PANCREAS: No significant abnormality.   SPLEEN: No significant abnormality.   ADRENAL GLANDS: No significant abnormality.   KIDNEYS: Multiple nonenhancing subcentimeter left renal cysts. There is a markedly T2-hypointense, T1 hyperintense nonenhancing lesion in the upper pole the left kidney (2.9 cm) likely representing a hemorrhagic cyst (axial image 46, series 9). No hydronephrosis.   LYMPH NODES: There is no enlarged portacaval lymph node measuring 2 cm. Otherwise, no enlarged lymph nodes.   ABDOMINAL VESSELS: No significant abnormality.   VISUALIZED BOWEL: No significant abnormality.   PERITONEUM/RETROPERITONEUM: No significant  abnormality.   LOWER THORAX: No significant abnormality.   BONES: No significant abnormality.       1. Choledocholithiasis. 8.8 cm x 0.8 cm stone at the ampulla is noted with moderate diffuse intrahepatic and extrahepatic biliary ductal dilatation.   2. Cholelithiasis without acute cholecystitis.   3. Numerous hepatic and renal cysts, predominately simple with several proteinaceous/hemorrhagic cyst as well as detailed above.   MACRO: None.   Signed by: Percy English 1/23/2025 1:10 AM Dictation workstation:   [id number]    CT chest abdomen pelvis w IV contrast    Result Date: 1/22/2025  Interpreted By:  Fadumo Garcia, STUDY: CT CHEST ABDOMEN PELVIS W IV CONTRAST; ;  1/22/2025 3:31 pm   INDICATION: Signs/Symptoms:RUQ pain.   ,R10.11 Right upper quadrant pain   COMPARISON: CTA chest 08/21/2023   ACCESSION NUMBER(S): [id number]   ORDERING CLINICIAN: SEDRICK HIDALGO   TECHNIQUE: Imaging was performed from thoracic apex through ischial tuberosities with axial, coronal and sagittal images reconstructed. Patient received 75 mL Omnipaque 350 intravenously.   FINDINGS: Trachea and mainstem bronchi are patent. There is no endoluminal mass. 6 mm nodule with faint internal calcification is present in the medial right lower lobe (image 148 of 331) and is unchanged from the prior chest CT. A few scattered punctate nodules are also unchanged. No new or enlarging nodules are identified. There is mild atelectasis in the base of the right middle lobe. There is no pleural effusion.   Aorta is normal in size. Main pulmonary artery is normal in size. Heart is normal in size with no pericardial effusion.   Thyroid gland is nonenlarged. 1.5 cm hypodense nodule in the right thyroid lobe was obscured by artifact on the prior study. There are no pathologically enlarged mediastinal or hilar lymph nodes.   Esophagus is decompressed with no wall thickening.   Multiple hepatic cysts are present measuring up to 6.4 cm. No suspicious  hepatic masses are identified. Central intrahepatic biliary ductal dilatation and extrahepatic biliary ductal dilatation up to 1.2 cm is new since the prior study. No calcified stone is noted in the ductal system. Ductal dilatation extends to the level of the ampulla. The gallbladder is normal in size but contains a dependent layer of sludge.   No mass or inflammation is noted involving the pancreas.   Spleen is normal in size with no focal mass noted.   Adrenal glands appear normal. Kidneys enhance symmetrically with no hydronephrosis noted. No suspicious mass is identified.   Bowel loops are nondilated. Appendix appears normal. No ascites, pneumoperitoneum or mesenteric inflammation is identified.   Aorta and vena cava are normal in size. There are no pathologically enlarged retroperitoneal, iliac or inguinal lymph nodes identified.   Urinary bladder is normal in appearance with no wall thickening.   Uterus is nonenlarged. Myometrial calcification in the fundus probably reflects a small fibroid.   A small amount of fat herniates into the umbilicus.   Osseous structures show no acute or suspicious abnormality.       There is new biliary ductal dilatation compared to the prior study. No obvious calcified ductal stone is noted but a noncalcified stone in the distal duct is possible. There is no pancreatic mass identified. Ampullary dysfunction or stricture is also consideration. Consider MRCP.   Multiple hepatic cysts   6 mm partially calcified right lower lobe nodule is unchanged from prior chest CT.     MACRO: Critical Finding:  See findings. Notification was initiated on 1/22/2025 at 4:10 pm by  Fadumo Garcia.  (**-OCF-**) Instructions:   Signed by: Fadumo Garcia 1/22/2025 4:10 PM Dictation workstation:   [id number]   Results for orders placed or performed in visit on 01/22/25 (from the past 24 hours)   CBC and Auto Differential   Result Value Ref Range    WBC 10.1 4.4 - 11.3 x10*3/uL    nRBC 0.0 0.0 - 0.0  /100 WBCs    RBC 4.65 4.00 - 5.20 x10*6/uL    Hemoglobin 12.6 12.0 - 16.0 g/dL    Hematocrit 40.3 36.0 - 46.0 %    MCV 87 80 - 100 fL    MCH 27.1 26.0 - 34.0 pg    MCHC 31.3 (L) 32.0 - 36.0 g/dL    RDW 14.9 (H) 11.5 - 14.5 %    Platelets 377 150 - 450 x10*3/uL    Neutrophils % 73.2 40.0 - 80.0 %    Immature Granulocytes %, Automated 0.4 0.0 - 0.9 %    Lymphocytes % 17.0 13.0 - 44.0 %    Monocytes % 6.9 2.0 - 10.0 %    Eosinophils % 1.9 0.0 - 6.0 %    Basophils % 0.6 0.0 - 2.0 %    Neutrophils Absolute 7.38 1.20 - 7.70 x10*3/uL    Immature Granulocytes Absolute, Automated 0.04 0.00 - 0.70 x10*3/uL    Lymphocytes Absolute 1.72 1.20 - 4.80 x10*3/uL    Monocytes Absolute 0.70 0.10 - 1.00 x10*3/uL    Eosinophils Absolute 0.19 0.00 - 0.70 x10*3/uL    Basophils Absolute 0.06 0.00 - 0.10 x10*3/uL   Comprehensive Metabolic Panel   Result Value Ref Range    Glucose 107 (H) 74 - 99 mg/dL    Sodium 138 136 - 145 mmol/L    Potassium 4.2 3.5 - 5.3 mmol/L    Chloride 102 98 - 107 mmol/L    Bicarbonate 28 21 - 32 mmol/L    Anion Gap 12 10 - 20 mmol/L    Urea Nitrogen 22 6 - 23 mg/dL    Creatinine 1.10 (H) 0.50 - 1.05 mg/dL    eGFR 56 (L) >60 mL/min/1.73m*2    Calcium 9.7 8.6 - 10.3 mg/dL    Albumin 4.1 3.4 - 5.0 g/dL    Alkaline Phosphatase 681 (H) 33 - 136 U/L    Total Protein 7.1 6.4 - 8.2 g/dL     (H) 9 - 39 U/L    Bilirubin, Total 0.8 0.0 - 1.2 mg/dL     (H) 7 - 45 U/L   Amylase   Result Value Ref Range    Amylase 18 (L) 29 - 103 U/L   Lipase   Result Value Ref Range    Lipase 5 (L) 9 - 82 U/L          Assessment/Plan     64 y.o. female with h/o hypertension, morbid obesity, hyperlipidemia, GERD, hepatic and kidney cysts, hepatic steatosis, osteoporosis, presented with 1 month history of right upper quadrant abdominal pain, poor appetite, weight loss, imaging suggestive of intrahepatic and extrahepatic biliary ductal dilation, choledocholithiasis, no evidence of cholangitis.  She reports history of similar  symptoms couple of years ago, no interventions were done.    -N.p.o.  -Plan for ERCP  -Continue to monitor LFTs and coags  -Further recommendations pending ERCP results.  Consider surgical evaluation for laparoscopic cholecystectomy.    Elizabeth Munoz, MED-CNP

## 2025-01-23 NOTE — PROGRESS NOTES
Pharmacy Medication History     Source of Information: PATIENT    Additional concerns with the patient's PTA list.     The following updates were made to the Prior to Admission medication list:     Medications ADDED:   N/A  Medications CHANGED:  N/A  Medications REMOVED:   N/A  Medications NOT TAKING:   ZALEPLON 10MG  NULYTELY 420 GRAM SOL  CETIRIZINE 10MG    Allergy reviewed : Yes    Meds 2 Beds : Yes    Outpatient pharmacy confirmed and updated in chart : Yes    Pharmacy name: [facility]    The list below reflectives the updated PTA list. Please review each medication in order reconciliation for additional clarification and justification.    Prior to Admission Medications   Prescriptions Last Dose Informant     LUTEIN-ZEAXANTHIN ORAL       Sig: Take 1 tablet by mouth once daily.   allopurinol (Zyloprim) 100 mg tablet 1/22/2025 Morning      Sig: Take 1 tablet (100 mg) by mouth once daily.                cetirizine-pseudoephedrine (ZyrTEC-D) 5-120 mg 12 hr tablet 1/22/2025 Morning      Sig: Take 1 tablet by mouth 2 times a day.   cholecalciferol (Vitamin D-3) 5,000 Units tablet 1/21/2025      Sig: Take 1 tablet (5,000 Units) by mouth once daily.                hydrOXYzine pamoate (Vistaril) 25 mg capsule Past Week      Sig: Take 1 capsule (25 mg) by mouth 2 times a day.   krill-om-3-dha-epa-phospho-ast (krill oil) 1,[id number] mg capsule       Sig: Take by mouth.   olmesartan-hydrochlorothiazide (BENIcar HCT) 20-12.5 mg tablet 1/22/2025 Morning      Sig: Take 1 tablet by mouth once daily.   omeprazole OTC (PriLOSEC OTC) 20 mg EC tablet 1/21/2025 Morning      Sig: Take by mouth.                                Facility-Administered Medications: None       The list below reflectives the updated allergy list. Please review each documented allergy for additional clarification and justification.    Allergies   Allergen Reactions    Cat Hair Standardized Allergenic Extract Other     congestion    Varicella-Zoster Virus  Glycoprotein E, Recombinant Swelling    Weed Pollen-Dog Fennel Other     congestion          01/23/25 at 12:30 PM - Shaylee Anglin

## 2025-01-23 NOTE — H&P
"History Of Present Illness  Arianne Jolly \"Dionne\" is a 64 y.o. female with past medical history of hypertension, gout, who is presenting as a transfer from [facility] ED for further evaluation of her choledocholithiasis per MRCP.  Patient stated that she has recently seen her PCP, for evaluation of persistent right upper quadrant pain that began around December 23, 2024, waxing and waning. States persistent symptoms made her go to PCP, who sent for CT scan of abdominal pelvis, with result showing gallstones and a dilated gallbladder duct.  Patient stated that she was told to go to [facility], where she was evaluated at the hospital, and told that should be transferred to [facility] for further management.  Patient arrived on the floor uneventfully, she was seen at bedside, stated that right upper quadrant pain is controlled with medication she received. Stated at onset, she had worsening pain with movement, positional mostly, exacerbated with sneezing or coughing, but denied fever, chills, nausea, vomiting, diarrhea or constipation.  Patient also admitted to prior episode of similar symptoms about a year ago for which she was evaluated [facility], was told that she got gallstones, and also informed to follow-up for cysts in the liver which she was told was benign, and final diagnosis was pleurisy at that time.  Patient stated she never had any recurrent symptoms until last month.  [facility], patient underwent MRCP which shows choledocholithiasis with dilated gallbladder duct, and decision made to transfer to [facility] for ERCP.     Past Medical History  No past medical history on file.    Surgical History  Past Surgical History:   Procedure Laterality Date    OTHER SURGICAL HISTORY  11/03/2022    Dilation and curettage        Social History  She reports that she quit smoking about 32 years ago. Her smoking use included cigarettes. She has never used smokeless tobacco. She reports that she does not " currently use alcohol. She reports that she does not currently use drugs.    Family History  No family history on file.     Allergies  Cat hair standardized allergenic extract; Varicella-zoster virus glycoprotein e, recombinant; and Weed pollen-dog fennel    Review of Systems  A 10 point review of systems was conducted, with patient admitting to symptoms as described in HPI above and deny having any other symptoms at this time.  Physical Exam   Constitutional: Pleasant, obese, alert active, cooperative not in acute distress  Eyes: PERRLA, clear sclera  ENMT: Moist mucosal membranes, no exudate  Head / Neck: Atraumatic, normocephalic, supple neck, JVP not visualized  Lungs: Patent airways, CTABL  Heart: RRR, S1S2, no murmurs appreciated, palpable pulses in all extremities  GI: Soft, not significantly tender to palpation, large abdomen, bowel sounds present in all quadrants  MSK: Moves all extremities freely, no restriction  of ROM, no joint edema  Extremities: Intact x 4, no peripheral edema  : No England catheter inserted  Breast: Deferred  Neurological: AAO x 3 to person, place and date, facial muscles symmetrical, sensation intact, strength 4/4, no acute focal neurological deficits appreciated  Psychological: Appropriate mood and behavior    Last Recorded Vitals  Blood pressure 127/75, pulse 75, temperature 36.9 °C (98.5 °F), resp. rate 17, SpO2 96%.    Relevant Results        Scheduled medications  allopurinol, 100 mg, oral, Daily  cholecalciferol, 5,000 Units, oral, Daily  enoxaparin, 40 mg, subcutaneous, q12h KIMO  hydrOXYzine pamoate, 25 mg, oral, BID  polyethylene glycol, 17 g, oral, Daily  valsartan 160 mg, hydroCHLOROthiazide 12.5 mg for Diovan HCT, , oral, Daily      Continuous medications  lactated Ringer's, 100 mL/hr      PRN medications  PRN medications: acetaminophen **OR** acetaminophen **OR** acetaminophen, HYDROmorphone, HYDROmorphone, ondansetron **OR** ondansetron       Assessment/Plan   64 y.o.  female with past medical history of hypertension, gout, who is presenting as a transfer from [facility] ED for further evaluation of her choledocholithiasis per MRCP.    Choledocholithiasis with dilated bile duct  -Presenting as a transfer for [facility] after MRCP showed choledocholithiasis  -GI consulted for ERCP  -General Surgery consulted for potential cholecystectomy  -N.p.o.  - mL/h  -Zofran 4 mg IV push every 6 hours as needed nausea  -Pantoprazole 40 mg IV push daily   -Dilaudid 0.2 mg IV push every 4 hours as needed moderate pain, 0.5 mg IV push as needed severe pain    Hypertension: Stable on arrival  -Continue home regimen of hydrochlorothiazide 12.5 mg daily, valsartan 160 mg substituted for olmesartan 20mg    Diet  -N.p.o.    DVT prophylaxis  -Lovenox 40 mg SQ twice daily     Disposition: Presenting as transfer from [facility] for ERCP, need further management, with consultation of GI, may proceed with cholecystectomy, discharge pending clinical stability    Anticipated length of stay greater than 3 midnights if undergoing cholecystectomy, less than 2 midnight if limited to ERCP.    I spent 60 minutes in the professional and overall care of this patient.      Alejandro Torres,

## 2025-01-23 NOTE — CARE PLAN
The patient's goals for the shift include      The clinical goals for the shift include prevent obstruction and reduce inflammation      Problem: Pain  Goal: Takes deep breaths with improved pain control throughout the shift  Outcome: Progressing  Goal: Turns in bed with improved pain control throughout the shift  Outcome: Progressing  Goal: Walks with improved pain control throughout the shift  Outcome: Progressing  Goal: Performs ADL's with improved pain control throughout shift  Outcome: Progressing  Goal: Participates in PT with improved pain control throughout the shift  Outcome: Progressing  Goal: Free from opioid side effects throughout the shift  Outcome: Progressing  Goal: Free from acute confusion related to pain meds throughout the shift  Outcome: Progressing

## 2025-01-23 NOTE — PROGRESS NOTES
For full history, physical exam, and prior hospital course, please see previous ED provider note. This is in addition to the primary record.    Chief Complaint   Patient presents with    Abdominal Pain     Pt to ED following CT scan done earlier today that showed gall stones and was told by PCP to come to ED. Pt with RUQ pain, no nausea, vomiting, diarrhea. No urinary issues, fever, chills, body aches. Pt with trouble with deep breathing as she gets more discomfort in the RUQ. No chest pain.        Comments/Additional Findings: Patient was signed out to me by Dr Mariano at change of shift.   Pending items at this time include MRI to result        I discussed the differential, results and plan with the patient and/or family/friend/caregiver if present. All questions answered.          ED Course as of 01/23/25 0401   Thu Jan 23, 2025   0124 Mri shows 1. Choledocholithiasis. 8.8 cm x 0.8 cm stone at the ampulla is noted  with moderate diffuse intrahepatic and extrahepatic biliary ductal  dilatation.      2. Cholelithiasis without acute cholecystitis.      3. Numerous hepatic and renal cysts, predominately simple with  several proteinaceous/hemorrhagic cyst as well as detailed above.   [WL]   0401 Spoke with Dr. Michael at [facility] who accepted patient as a transfer [WL]      ED Course User Index  [WL] King Isbell DO         Diagnoses as of 01/23/25 0401   Transaminitis   Common bile duct dilatation   Choledocholithiasis       MRCP pancreas w and wo IV contrast   Final Result   1. Choledocholithiasis. 8.8 cm x 0.8 cm stone at the ampulla is noted   with moderate diffuse intrahepatic and extrahepatic biliary ductal   dilatation.        2. Cholelithiasis without acute cholecystitis.        3. Numerous hepatic and renal cysts, predominately simple with   several proteinaceous/hemorrhagic cyst as well as detailed above.        MACRO:   None.        Signed by: Percy English 1/23/2025 1:10 AM   Dictation workstation:    [id number]        Labs Reviewed - No data to display        Procedure  Procedures             Note: This note was dictated by speech recognition. Minor errors in transcription may be present.

## 2025-01-23 NOTE — ANESTHESIA PREPROCEDURE EVALUATION
"Patient: Arianne Jolly \"Dionne\"    Procedure Information       Date/Time: 01/23/25 4957    Scheduled providers: Anthony Walker DO; Edgar Ragsdale MD    Procedure: ERCP    Location: [facility]            Relevant Problems   Anesthesia (within normal limits)      Cardiac   (+) Benign essential hypertension   (+) Hyperlipidemia   (+) Other hyperlipidemia      Pulmonary  Env allergies        Neuro  insomnia      GI   (+) Esophageal reflux      /Renal  Renal cysts      Liver  Hepatic cysts  Biliary dilatation       (+) Choledocholithiasis      Endocrine   (+) Obesity      Musculoskeletal  Gout       Clinical information reviewed:    Allergies  Meds               NPO Detail:  No data recorded     Physical Exam    Airway  Mallampati: II     Cardiovascular    Dental    Pulmonary    Abdominal            Anesthesia Plan    History of general anesthesia?: yes  History of complications of general anesthesia?: no    ASA 3 - emergent     general     intravenous induction   Anesthetic plan and risks discussed with patient.      "

## 2025-01-23 NOTE — ANESTHESIA PROCEDURE NOTES
Airway  Date/Time: 1/23/2025 4:02 PM  Urgency: elective    Airway not difficult    Staffing  Performed: CRNA   Authorized by: Edgar Ragsdale MD    Performed by: MED Larsen-CRNA, DNAP  Patient location during procedure: OR    Indications and Patient Condition  Indications for airway management: anesthesia  Spontaneous ventilation: present  Sedation level: deep  Preoxygenated: yes  Patient position: sniffing  Mask difficulty assessment: 1 - vent by mask    Final Airway Details  Final airway type: endotracheal airway      Successful airway: ETT  Cuffed: yes   Successful intubation technique: video laryngoscopy (Lee)  Facilitating devices/methods: intubating stylet  Endotracheal tube insertion site: oral  Blade: Gaetano  Blade size: #3  ETT size (mm): 7.0  Cormack-Lehane Classification: grade I - full view of glottis  Placement verified by: chest auscultation and capnometry   Cuff volume (mL): 5  Measured from: lips  ETT to lips (cm): 21  Number of attempts at approach: 1  Number of other approaches attempted: 0    Additional Comments  Easy, atraumatic, grade one view, on first attempt using elective Lee without difficulty or evidence of aspiration noted. Head and neck maintained in neutral alignment. Dentition unchanged, no complications noted.

## 2025-01-24 VITALS
BODY MASS INDEX: 40.76 KG/M2 | DIASTOLIC BLOOD PRESSURE: 65 MMHG | RESPIRATION RATE: 18 BRPM | TEMPERATURE: 97.1 F | OXYGEN SATURATION: 97 % | SYSTOLIC BLOOD PRESSURE: 112 MMHG | HEART RATE: 61 BPM | WEIGHT: 268.96 LBS | HEIGHT: 68 IN

## 2025-01-24 LAB
ALBUMIN SERPL BCP-MCNC: 3.7 G/DL (ref 3.4–5)
ALP SERPL-CCNC: 659 U/L (ref 33–136)
ALT SERPL W P-5'-P-CCNC: 327 U/L (ref 7–45)
ANION GAP SERPL CALC-SCNC: 15 MMOL/L (ref 10–20)
AST SERPL W P-5'-P-CCNC: 131 U/L (ref 9–39)
BILIRUB SERPL-MCNC: 0.6 MG/DL (ref 0–1.2)
BUN SERPL-MCNC: 24 MG/DL (ref 6–23)
CALCIUM SERPL-MCNC: 9.3 MG/DL (ref 8.6–10.3)
CHLORIDE SERPL-SCNC: 105 MMOL/L (ref 98–107)
CO2 SERPL-SCNC: 24 MMOL/L (ref 21–32)
CREAT SERPL-MCNC: 1.11 MG/DL (ref 0.5–1.05)
EGFRCR SERPLBLD CKD-EPI 2021: 55 ML/MIN/1.73M*2
ERYTHROCYTE [DISTWIDTH] IN BLOOD BY AUTOMATED COUNT: 14.6 % (ref 11.5–14.5)
GLUCOSE SERPL-MCNC: 135 MG/DL (ref 74–99)
HCT VFR BLD AUTO: 35.5 % (ref 36–46)
HGB BLD-MCNC: 11.2 G/DL (ref 12–16)
MCH RBC QN AUTO: 27.1 PG (ref 26–34)
MCHC RBC AUTO-ENTMCNC: 31.5 G/DL (ref 32–36)
MCV RBC AUTO: 86 FL (ref 80–100)
NRBC BLD-RTO: 0 /100 WBCS (ref 0–0)
PLATELET # BLD AUTO: 366 X10*3/UL (ref 150–450)
POTASSIUM SERPL-SCNC: 4.8 MMOL/L (ref 3.5–5.3)
PROT SERPL-MCNC: 6.5 G/DL (ref 6.4–8.2)
RBC # BLD AUTO: 4.13 X10*6/UL (ref 4–5.2)
SODIUM SERPL-SCNC: 139 MMOL/L (ref 136–145)
WBC # BLD AUTO: 8.5 X10*3/UL (ref 4.4–11.3)

## 2025-01-24 PROCEDURE — 80053 COMPREHEN METABOLIC PANEL: CPT | Performed by: INTERNAL MEDICINE

## 2025-01-24 PROCEDURE — 99239 HOSP IP/OBS DSCHRG MGMT >30: CPT | Performed by: INTERNAL MEDICINE

## 2025-01-24 PROCEDURE — 2500000005 HC RX 250 GENERAL PHARMACY W/O HCPCS: Performed by: ANESTHESIOLOGY

## 2025-01-24 PROCEDURE — 99232 SBSQ HOSP IP/OBS MODERATE 35: CPT | Performed by: NURSE PRACTITIONER

## 2025-01-24 PROCEDURE — 2500000001 HC RX 250 WO HCPCS SELF ADMINISTERED DRUGS (ALT 637 FOR MEDICARE OP): Performed by: INTERNAL MEDICINE

## 2025-01-24 PROCEDURE — 85027 COMPLETE CBC AUTOMATED: CPT | Performed by: INTERNAL MEDICINE

## 2025-01-24 PROCEDURE — 36415 COLL VENOUS BLD VENIPUNCTURE: CPT | Performed by: INTERNAL MEDICINE

## 2025-01-24 RX ADMIN — ALLOPURINOL 100 MG: 100 TABLET ORAL at 09:05

## 2025-01-24 RX ADMIN — Medication 2 L/MIN: at 09:07

## 2025-01-24 SDOH — ECONOMIC STABILITY: TRANSPORTATION INSECURITY: IN THE PAST 12 MONTHS, HAS LACK OF TRANSPORTATION KEPT YOU FROM MEDICAL APPOINTMENTS OR FROM GETTING MEDICATIONS?: NO

## 2025-01-24 SDOH — ECONOMIC STABILITY: FOOD INSECURITY: WITHIN THE PAST 12 MONTHS, YOU WORRIED THAT YOUR FOOD WOULD RUN OUT BEFORE YOU GOT THE MONEY TO BUY MORE.: NEVER TRUE

## 2025-01-24 SDOH — SOCIAL STABILITY: SOCIAL INSECURITY: ARE YOU MARRIED, WIDOWED, DIVORCED, SEPARATED, NEVER MARRIED, OR LIVING WITH A PARTNER?: MARRIED

## 2025-01-24 SDOH — ECONOMIC STABILITY: HOUSING INSECURITY: IN THE LAST 12 MONTHS, WAS THERE A TIME WHEN YOU WERE NOT ABLE TO PAY THE MORTGAGE OR RENT ON TIME?: NO

## 2025-01-24 SDOH — HEALTH STABILITY: MENTAL HEALTH: HOW OFTEN DO YOU HAVE A DRINK CONTAINING ALCOHOL?: NEVER

## 2025-01-24 SDOH — ECONOMIC STABILITY: INCOME INSECURITY: IN THE PAST 12 MONTHS HAS THE ELECTRIC, GAS, OIL, OR WATER COMPANY THREATENED TO SHUT OFF SERVICES IN YOUR HOME?: NO

## 2025-01-24 SDOH — HEALTH STABILITY: MENTAL HEALTH: HOW OFTEN DO YOU HAVE SIX OR MORE DRINKS ON ONE OCCASION?: NEVER

## 2025-01-24 SDOH — HEALTH STABILITY: MENTAL HEALTH: HOW MANY DRINKS CONTAINING ALCOHOL DO YOU HAVE ON A TYPICAL DAY WHEN YOU ARE DRINKING?: PATIENT DOES NOT DRINK

## 2025-01-24 SDOH — ECONOMIC STABILITY: HOUSING INSECURITY: AT ANY TIME IN THE PAST 12 MONTHS, WERE YOU HOMELESS OR LIVING IN A SHELTER (INCLUDING NOW)?: NO

## 2025-01-24 SDOH — ECONOMIC STABILITY: FOOD INSECURITY: WITHIN THE PAST 12 MONTHS, THE FOOD YOU BOUGHT JUST DIDN'T LAST AND YOU DIDN'T HAVE MONEY TO GET MORE.: NEVER TRUE

## 2025-01-24 SDOH — ECONOMIC STABILITY: HOUSING INSECURITY: IN THE PAST 12 MONTHS, HOW MANY TIMES HAVE YOU MOVED WHERE YOU WERE LIVING?: 0

## 2025-01-24 SDOH — ECONOMIC STABILITY: FOOD INSECURITY: HOW HARD IS IT FOR YOU TO PAY FOR THE VERY BASICS LIKE FOOD, HOUSING, MEDICAL CARE, AND HEATING?: NOT HARD AT ALL

## 2025-01-24 ASSESSMENT — LIFESTYLE VARIABLES
SKIP TO QUESTIONS 9-10: 1
AUDIT-C TOTAL SCORE: 0

## 2025-01-24 ASSESSMENT — ACTIVITIES OF DAILY LIVING (ADL)
LACK_OF_TRANSPORTATION: NO
LACK_OF_TRANSPORTATION: NO

## 2025-01-24 ASSESSMENT — PAIN SCALES - GENERAL: PAINLEVEL_OUTOF10: 0 - NO PAIN

## 2025-01-24 NOTE — CARE PLAN
The patient's goals for the shift include      The clinical goals for the shift include safety maintained    Problem: Pain  Goal: Takes deep breaths with improved pain control throughout the shift  Outcome: Progressing  Goal: Turns in bed with improved pain control throughout the shift  Outcome: Progressing  Goal: Walks with improved pain control throughout the shift  Outcome: Progressing  Goal: Performs ADL's with improved pain control throughout shift  Outcome: Progressing  Goal: Participates in PT with improved pain control throughout the shift  Outcome: Progressing  Goal: Free from opioid side effects throughout the shift  Outcome: Progressing  Goal: Free from acute confusion related to pain meds throughout the shift  Outcome: Progressing

## 2025-01-24 NOTE — CONSULTS
Nutrition Consult Note  Nutrition Assessment      Reason for Assessment: Admission nursing screening    Arianne Jolly is a 64 y.o. year old female patient with Acute cholecystitis [K81.0]  Choledocholithiasis [K80.50]    referred for MST-2 wt loss and poor appetite      Chart reviewed and pt visited.  Patient Active Problem List   Diagnosis    Abnormal kidney function    Allergic rhinitis    Mild obstructive sleep apnea    Benign essential hypertension    Decreased GFR    Discoloration of skin of foot    Esophageal reflux    Hyperlipidemia    Insomnia    Lichen sclerosus et atrophicus of the vulva    Post-menopausal osteoporosis    Shortness of breath    Vertigo    Vitamin D deficiency    Other hyperlipidemia    Age-related nuclear cataract of both eyes    Hematuria    Vitreous floaters of both eyes    Abdominal pain    Pleurisy    Choledocholithiasis    Obesity     Per chart review:  -Poor appetite  -Admitted c/o sharp pain on rt side of abd   -Has gallstones  -Po intake <25% x 1 day  -ERCP yesterday; discussed lap brynn; pt declining surgery at this time    Per pt:  -No food allergies  -No difficulties chewing/swallowing  -10# wt loss in 1 month  -Poor appetite since 12/23  -Intake varied daily  -Does not want nutritional supplements at this time    Scheduled medications  allopurinol, 100 mg, oral, Daily  cholecalciferol, 5,000 Units, oral, Daily  enoxaparin, 40 mg, subcutaneous, q12h KIMO  hydrOXYzine pamoate, 25 mg, oral, BID  lidocaine, 0.1 mL, subcutaneous, Once  oxygen, , inhalation, Continuous - 02/gases  pantoprazole, 40 mg, intravenous, Daily  polyethylene glycol, 17 g, oral, Daily  valsartan 160 mg, hydroCHLOROthiazide 12.5 mg for Diovan HCT, , oral, Daily      Continuous medications     PRN medications  PRN medications: acetaminophen **OR** acetaminophen **OR** acetaminophen, benzocaine-menthol, benzonatate, HYDROmorphone, HYDROmorphone, HYDROmorphone, HYDROmorphone, meperidine, ondansetron **OR**  "ondansetron, ondansetron, oxyCODONE, promethazine    Nutrition Significant Labs:  BMP Trend:   Results from last 7 days   Lab Units 01/24/25  0518 01/22/25  1403   GLUCOSE mg/dL 135* 107*   CALCIUM mg/dL 9.3 9.7   SODIUM mmol/L 139 138   POTASSIUM mmol/L 4.8 4.2   CO2 mmol/L 24 28   CHLORIDE mmol/L 105 102   BUN mg/dL 24* 22   CREATININE mg/dL 1.11* 1.10*    , Liver Function Trend:   Results from last 7 days   Lab Units 01/24/25  0518 01/22/25  1403   ALK PHOS U/L 659* 681*   AST U/L 131* 226*   ALT U/L 327* 395*   BILIRUBIN TOTAL mg/dL 0.6 0.8    , Renal Lab Trend:   Results from last 7 days   Lab Units 01/24/25  0518 01/22/25  1403   POTASSIUM mmol/L 4.8 4.2   SODIUM mmol/L 139 138   EGFR mL/min/1.73m*2 55* 56*   BUN mg/dL 24* 22   CREATININE mg/dL 1.11* 1.10*    , Vit D:   Lab Results   Component Value Date    VITD25 56 11/17/2022      Dietary Orders (From admission, onward)       Start     Ordered    01/24/25 1134  Adult diet Fat restricted 60 gm  Diet effective now        Question:  Diet type  Answer:  Fat restricted 60 gm    01/24/25 1133    01/23/25 2040  May Participate in Room Service  ( ROOM SERVICE MAY PARTICIPATE)  Once        Question:  .  Answer:  Yes    01/23/25 2039                  History:  Energy Intake: Poor < 50 %  Food and Nutrient History: Poor intake over last 24 hours. Per pt- intake varying over last month (eating some meals, deferring others).    Anthropometrics:  Height: 172.7 cm (5' 7.99\")  Weight: 122 kg (268 lb 15.4 oz)  BMI (Calculated): 40.9       Significant Weight Loss: No       IBW/kg (Dietitian Calculated): 63.6 kg  Percent of IBW: 192 %     Energy Needs:  Height: 172.7 cm (5' 7.99\")  Temp: 36.1 °C (97 °F)    Total Energy Estimated Needs in 24 hours (kCal): 1850 kCal  Energy Estimated Needs per kg Body Weight in 24 hours (kCal/kg): 2450 kCal/kg  Method for Estimating Needs: 15-20kcal/kg    Total Protein Estimated Needs in 24 Hours (g): 65 g  Protein Estimated Needs per kg Body " Weight in 24 Hours (g/kg): 125 g/kg  Method for Estimating 24 Hour Protein Needs: 1.5-2.0g/kg (IBW)    Method for Estimating 24 Hour Fluid Needs: 1mL/kcal or MD recommendations       Nutrition Focused Physical Findings:  Orbital Fat Pads: Mild-Moderate (slight dark circles and slight hollowing)  Buccal Fat Pads: Mild-Moderate (flat cheeks, minimal bounce)    Temporalis: Mild-Moderate (slight depression)    Edema: none       Digestive System Findings: Abdominal pain       Nutrition Diagnosis   Malnutrition Diagnosis  Patient has Malnutrition Diagnosis: Yes  Diagnosis Status: New  Malnutrition Diagnosis: Moderate malnutrition related to acute disease or injury  Related to: acute condition  As Evidenced by: mild fat loss and mild muscle loss.    Nutrition Interventions/Recommendations      Food and/or Nutrient Delivery Interventions  Meals and Snacks: General healthful diet, Fat-modified diet   -Offered ONS, pt declines at this time.  -Small frequent meals      Nutrition Monitoring and Evaluation   Food and Nutrient Related History  Estimated Energy Intake: Energy intake greater or equal to 75% of estimated energy needs    Fluid Intake: Estimated fluid intake    Intake / Amount of food: Meets > 75% estimated energy needs    Anthropometrics: Body Composition/Growth/Weight History  Body Weight: Measured body weight, Body weight - Maintain stable weight    Biochemical Data, Medical Tests and Procedures  Electrolyte and Renal Panel: Other (Comment), BUN, Creatinine  Criteria: As clinically indicated    Gastrointestinal Profile: Other (Comment), Alkaline phosphatase, Alanine aminotransferase (ALT), Aspartate aminotransferase (AST)  Criteria: As clinically indicated    Glucose/Endocrine Profile: Glucose within normal limits ( mg/dL)  Criteria: As clinically indicated    Nutrition Focused Physical Findings  Adipose: Loss of subcutaneous fat       Digestive System Finding: Nausea, Abdominal pain    Muscles: Muscle  atrophy    Other: overall appearance and I/Os    Time Spent (min): 60 minutes  Last Date of Nutrition Visit: 01/24/25  Nutrition Follow-Up Needed?: Dietitian to reassess per policy  Follow up Comment: ALVARO Iraheta

## 2025-01-24 NOTE — SIGNIFICANT EVENT
Patient admitted for cholelithiasis. States she developed intermittent right abdominal pain a few days before Aime that felt dull at times and more sharp/severe at others. The pain was worse with bending over or lying on her right side in pain. She lost about 10 lbs within the past month due to lack of appetite but denies any nausea or vomiting.   She is now s/p ERCP yesterday with GI. General surgery was consulted for laparoscopic cholecystectomy this admission. Our recommendations for lap brynn along with the procedure and recovery period were discussed with the patient; however, she would not like to have surgery at this time. She is open to possibility of having the procedure electively in the future. She can follow up with general surgeon Dr. Tran in the outpatient setting whose information was placed in discharge info.

## 2025-01-24 NOTE — PROGRESS NOTES
01/24/25 1005   Discharge Planning   Living Arrangements Spouse/significant other   Support Systems Spouse/significant other   Assistance Needed none   Type of Residence Private residence   Number of Stairs to Enter Residence 3   Number of Stairs Within Residence 3   Do you have animals or pets at home? Yes   Type of Animals or Pets 2 dogs   Who is requesting discharge planning? Provider   Home or Post Acute Services None   Expected Discharge Disposition Home   Does the patient need discharge transport arranged? No   Financial Resource Strain   How hard is it for you to pay for the very basics like food, housing, medical care, and heating? Not hard   Housing Stability   In the last 12 months, was there a time when you were not able to pay the mortgage or rent on time? N   In the past 12 months, how many times have you moved where you were living? 0   At any time in the past 12 months, were you homeless or living in a shelter (including now)? N   Transportation Needs   In the past 12 months, has lack of transportation kept you from medical appointments or from getting medications? no   In the past 12 months, has lack of transportation kept you from meetings, work, or from getting things needed for daily living? No   Stroke Family Assessment   Stroke Family Assessment Needed No   Intensity of Service   Intensity of Service 0-30 min     1/24/25 1005  Met with patient at bedside to discuss discharge planning.  Patient lives at home with her  in a house.  She is independent with ADLs and drives at baseline.  She does not use any assistive devices for ambulation. She plans on returning home at discharge.  She denies any HHC, DME, or discharge needs.  She will notify the [facility] should any needs arise.   ADOD today pending surgery clearance.  Jennifer Perez RN T[facility]

## 2025-01-24 NOTE — DISCHARGE INSTRUCTIONS
You presented with gallstones that made your gallbladder dilated, and you underwent a procedure called ERCP, resulting in the resolution of your symptoms.  Usually surgery is involved, to discuss laparoscopic removal of your gallbladder, but at this time you preferred a conservative approach and not to proceed with surgery.  Please monitor your symptoms if you get in severe pain, fever, chills, nausea or vomiting, quality of primary care provider report to the nearest emergency room for medical attention.

## 2025-01-24 NOTE — DISCHARGE SUMMARY
"Discharge Diagnosis  Choledocholithiasis    Issues Requiring Follow-Up  Follow-up with PCP    Discharge Meds     Medication List      CONTINUE taking these medications     allopurinol 100 mg tablet; Commonly known as: Zyloprim   cetirizine-pseudoephedrine 5-120 mg 12 hr tablet; Commonly known as:   ZyrTEC-D   cholecalciferol 5,000 Units tablet; Commonly known as: Vitamin D-3   hydrOXYzine pamoate 25 mg capsule; Commonly known as: Vistaril; Take 1   capsule (25 mg) by mouth 2 times a day.   krill oil [id number] mg capsule; Generic drug:   krill-om-3-dha-epa-phospho-ast   LUTEIN-ZEAXANTHIN ORAL   olmesartan-hydrochlorothiazide 20-12.5 mg tablet; Commonly known as:   BENIcar HCT; Take 1 tablet by mouth once daily.   omeprazole OTC 20 mg EC tablet; Commonly known as: PriLOSEC OTC     STOP taking these medications     Cleocin T 1 % gel; Generic drug: clindamycin   polyethylene glycol-electrolytes 420 gram solution; Commonly known as:   Nulytely   zaleplon 10 mg capsule; Commonly known as: Sonata   ZyrTEC 10 mg tablet; Generic drug: cetirizine       Test Results Pending At Discharge  Pending Labs       No current pending labs.            Hospital Course   Arianne Jolly \"Dionne\" is a 64 y.o. female with past medical history of hypertension, gout, who is presenting as a transfer from [facility] ED for further evaluation of her choledocholithiasis per MRCP.  Patient stated that she has recently seen her PCP, for evaluation of persistent right upper quadrant pain that began around December 23, 2024, waxing and waning. States persistent symptoms made her go to PCP, who sent for CT scan of abdominal pelvis, with result showing gallstones and a dilated gallbladder duct.  Patient stated that she was told to go to [facility], where she was evaluated at the hospital, and told that should be transferred to [facility] for further management.  Patient arrived on the floor uneventfully, she was seen at bedside, stated that right upper " quadrant pain is controlled with medication she received. Stated at onset, she had worsening pain with movement, positional mostly, exacerbated with sneezing or coughing, but denied fever, chills, nausea, vomiting, diarrhea or constipation.  Patient also admitted to prior episode of similar symptoms about a year ago for which she was evaluated cardiac hospital, was told that she got gallstones, and also informed to follow-up for cysts in the liver which she was told was benign, and final diagnosis was pleurisy at that time.  Patient stated she never had any recurrent symptoms until last month.  [facility] hospital, patient underwent MRCP which shows choledocholithiasis with dilated gallbladder duct, and decision made to transfer to [facility] for ERCP.    GI was consulted, patient underwent ERCP with biliary sphincterotomy and stone removal.  She tolerated diet postoperatively, and monitored by GI, recommended surgery consult to discuss cholecystectomy.  Patient was seen by surgery team, but opted for a conservative approach, she was given Dr. Tran contact for future need if necessary.   On hospital day 2 patient remained stable, continue to tolerate clear diet, and and found stable for discharge home with follow-up with GI and PCP.    Greater than 30 minutes were dedicated to this discharge that included educating patient and coordinating care.    Pertinent Physical Exam At Time of Discharge  Physical Exam   Constitutional: Pleasant, obese, alert active, cooperative not in acute distress  Eyes: PERRLA, clear sclera  ENMT: Moist mucosal membranes, no exudate  Head / Neck: Atraumatic, normocephalic, supple neck, JVP not visualized  Lungs: Patent airways, CTABL  Heart: RRR, S1S2, no murmurs appreciated, palpable pulses in all extremities  GI: Soft, not significantly tender to palpation, large abdomen, bowel sounds present in all quadrants  MSK: Moves all extremities freely, no restriction  of ROM, no joint  edema  Extremities: Intact x 4, no peripheral edema  : No England catheter inserted  Breast: Deferred  Neurological: AAO x 3 to person, place and date, facial muscles symmetrical, sensation intact, strength 4/4, no acute focal neurological deficits appreciated  Psychological: Appropriate mood and behavior  Outpatient Follow-Up  Future Appointments   Date Time Provider Department Center   2/4/2025  3:15 PM Taryn Amezquita MD [id number] [facility] [place], DO

## 2025-01-24 NOTE — PROGRESS NOTES
"GI Daily Progress Note    Assessment/Plan:    Assessment & Plan  Choledocholithiasis    Obesity    64 years old female with hypertension, morbid obesity, hyperlipidemia, GERD, hepatic and kidney cyst, hepatic steatosis, presented with right upper quadrant abdominal pain, poor appetite, weight loss, intra and extrahepatic biliary dilation, choledocholithiasis.  Status post ERCP, biliary sphincterotomy and stone removal.    -Diet as tolerated  -Monitor LFTs, monitor for bleeding  -Recommend surgical consultation to discuss lap cholecystectomy  -She should follow-up with your liver specialist as outpatient  -She can be discharged from GI standpoint, will sign off, please call if any questions or further assistance needed.     LOS: 1 day     Arianne Jolly \"Dionne\" is a 64 y.o. female who was admitted with Choledocholithiasis. She reports she is feeling much better, abdominal pain has resolved.  She tolerated diet.  LFTs are trending down.    Subjective:    Patient expresses she is feeling much better today  Patient denies abdominal pain, nausea or vomiting    Objective:    Vital signs in last 24 hours:  Temp:  [36.1 °C (97 °F)-37.1 °C (98.7 °F)] 36.1 °C (97 °F)  Heart Rate:  [62-82] 62  Resp:  [12-19] 16  BP: ()/(54-80) 107/55    Intake/Output last 3 shifts:  I/O last 3 completed shifts:  In: 1100 [I.V.:600; IV Piggyback:500]  Out: 0   Intake/Output this shift:  No intake/output data recorded.    Physical Exam  Vitals reviewed.   Constitutional:       General: She is not in acute distress.     Appearance: Normal appearance. She is obese.   Cardiovascular:      Rate and Rhythm: Normal rate and regular rhythm.      Heart sounds: Normal heart sounds.   Pulmonary:      Breath sounds: Normal breath sounds.   Abdominal:      General: Bowel sounds are normal. There is no distension.      Palpations: Abdomen is soft.      Tenderness: There is no abdominal tenderness. There is no guarding.   Neurological:      General: No " focal deficit present.      Mental Status: She is alert and oriented to person, place, and time.   Psychiatric:         Mood and Affect: Mood normal.          Results for orders placed or performed during the hospital encounter of 01/23/25 (from the past 24 hours)   CBC   Result Value Ref Range    WBC 8.5 4.4 - 11.3 x10*3/uL    nRBC 0.0 0.0 - 0.0 /100 WBCs    RBC 4.13 4.00 - 5.20 x10*6/uL    Hemoglobin 11.2 (L) 12.0 - 16.0 g/dL    Hematocrit 35.5 (L) 36.0 - 46.0 %    MCV 86 80 - 100 fL    MCH 27.1 26.0 - 34.0 pg    MCHC 31.5 (L) 32.0 - 36.0 g/dL    RDW 14.6 (H) 11.5 - 14.5 %    Platelets 366 150 - 450 x10*3/uL   Comprehensive Metabolic Panel   Result Value Ref Range    Glucose 135 (H) 74 - 99 mg/dL    Sodium 139 136 - 145 mmol/L    Potassium 4.8 3.5 - 5.3 mmol/L    Chloride 105 98 - 107 mmol/L    Bicarbonate 24 21 - 32 mmol/L    Anion Gap 15 10 - 20 mmol/L    Urea Nitrogen 24 (H) 6 - 23 mg/dL    Creatinine 1.11 (H) 0.50 - 1.05 mg/dL    eGFR 55 (L) >60 mL/min/1.73m*2    Calcium 9.3 8.6 - 10.3 mg/dL    Albumin 3.7 3.4 - 5.0 g/dL    Alkaline Phosphatase 659 (H) 33 - 136 U/L    Total Protein 6.5 6.4 - 8.2 g/dL     (H) 9 - 39 U/L    Bilirubin, Total 0.6 0.0 - 1.2 mg/dL     (H) 7 - 45 U/L

## 2025-01-27 ENCOUNTER — TELEPHONE (OUTPATIENT)
Dept: PRIMARY CARE | Facility: CLINIC | Age: 65
End: 2025-01-27
Payer: COMMERCIAL

## 2025-01-27 ENCOUNTER — DOCUMENTATION (OUTPATIENT)
Dept: CARE COORDINATION | Facility: CLINIC | Age: 65
End: 2025-01-27
Payer: COMMERCIAL

## 2025-01-27 NOTE — TELEPHONE ENCOUNTER
Transition of Care    Inpatient facility: [facility]  Discharge diagnosis: Choledocholithiasis  Discharged to: Home  Discharge date: 1/24/25  Initial Call date: 1/27/25  Spoke with patient/caregiver: Patient                                                                     Do you need assistance  visits prior to your PCP visit: No  Home health care ordered: No  Have you been contacted by home care and have a start of care date: No  Are you taking medications as prescribed at discharge: Yes    Referral to [facility] Pharmacist: No  Patient advised to bring all medications to PCP follow-up appointment.  Patient advised to follow discharge instructions until provider follow-up.  TCM visit date: 1/30/25  TCM provider visit with: BREE Amezquita MD

## 2025-01-30 ENCOUNTER — OFFICE VISIT (OUTPATIENT)
Dept: PRIMARY CARE | Facility: CLINIC | Age: 65
End: 2025-01-30
Payer: COMMERCIAL

## 2025-01-30 VITALS
WEIGHT: 277.6 LBS | DIASTOLIC BLOOD PRESSURE: 62 MMHG | TEMPERATURE: 97.3 F | BODY MASS INDEX: 42.22 KG/M2 | OXYGEN SATURATION: 95 % | HEART RATE: 68 BPM | SYSTOLIC BLOOD PRESSURE: 112 MMHG

## 2025-01-30 DIAGNOSIS — K80.50 CHOLEDOCHOLITHIASIS: Primary | ICD-10-CM

## 2025-01-30 DIAGNOSIS — E66.01 CLASS 3 SEVERE OBESITY DUE TO EXCESS CALORIES WITH SERIOUS COMORBIDITY AND BODY MASS INDEX (BMI) OF 40.0 TO 44.9 IN ADULT: ICD-10-CM

## 2025-01-30 DIAGNOSIS — K21.9 GASTROESOPHAGEAL REFLUX DISEASE WITHOUT ESOPHAGITIS: ICD-10-CM

## 2025-01-30 DIAGNOSIS — M19.90 ARTHRITIS: ICD-10-CM

## 2025-01-30 DIAGNOSIS — I10 BENIGN ESSENTIAL HYPERTENSION: ICD-10-CM

## 2025-01-30 DIAGNOSIS — E66.813 CLASS 3 SEVERE OBESITY DUE TO EXCESS CALORIES WITH SERIOUS COMORBIDITY AND BODY MASS INDEX (BMI) OF 40.0 TO 44.9 IN ADULT: ICD-10-CM

## 2025-01-30 DIAGNOSIS — R79.89 ABNORMAL LFTS: ICD-10-CM

## 2025-01-30 PROCEDURE — 1160F RVW MEDS BY RX/DR IN RCRD: CPT | Performed by: INTERNAL MEDICINE

## 2025-01-30 PROCEDURE — 3074F SYST BP LT 130 MM HG: CPT | Performed by: INTERNAL MEDICINE

## 2025-01-30 PROCEDURE — 1159F MED LIST DOCD IN RCRD: CPT | Performed by: INTERNAL MEDICINE

## 2025-01-30 PROCEDURE — 1036F TOBACCO NON-USER: CPT | Performed by: INTERNAL MEDICINE

## 2025-01-30 PROCEDURE — 3078F DIAST BP <80 MM HG: CPT | Performed by: INTERNAL MEDICINE

## 2025-01-30 PROCEDURE — 1111F DSCHRG MED/CURRENT MED MERGE: CPT | Performed by: INTERNAL MEDICINE

## 2025-01-30 PROCEDURE — 99496 TRANSJ CARE MGMT HIGH F2F 7D: CPT | Performed by: INTERNAL MEDICINE

## 2025-01-30 NOTE — PROGRESS NOTES
"Subjective   Patient ID: Arianne Jolly \"Dionne\" is a 64 y.o. female who presents for Hospital Follow-up (TCM //Is feeling better from hospital ).  HPI    TCM    Dx choledocholithiasis, abnormal LFT's        Sx resolved    Rec's rev'd     Cholelithiasis  Low fat diet discussed  Consider choleycystectomy     Hyperglycemia  on diet  HBA1C 6.0  10-24     Cologuard positive  Colonoscopy 6-24  polyp recheck 27     rheum following arthritis  Skyrizi working     pleurisy  basically resolved     Liver cysts on CT / US   MRI discussed  GI no further workup     Hypertension stable on therapy no side effects     Insomnia stable on therapy no side effects     Allergic rhinitis stable on therapy no side effects     GERD stable on therapy no side effects     Diet and exercise reviewed      Review of Systems   All other systems reviewed and are negative.      Objective   /62   Pulse 68   Temp 36.3 °C (97.3 °F)   Wt 126 kg (277 lb 9.6 oz)   SpO2 95%   BMI 42.22 kg/m²   Lab Results   Component Value Date    WBC 8.5 01/24/2025    HGB 11.2 (L) 01/24/2025    HCT 35.5 (L) 01/24/2025     01/24/2025    CHOL 181 11/17/2022    TRIG 134 11/17/2022    HDL 55.0 11/17/2022     (H) 01/24/2025     (H) 01/24/2025     01/24/2025    K 4.8 01/24/2025     01/24/2025    CREATININE 1.11 (H) 01/24/2025    BUN 24 (H) 01/24/2025    CO2 24 01/24/2025    TSH 2.91 11/17/2022    HGBA1C 6.0 (H) 10/24/2024           Physical Exam  Vitals reviewed.   Constitutional:       Appearance: Normal appearance. She is obese.   HENT:      Head: Normocephalic and atraumatic.      Mouth/Throat:      Pharynx: No posterior oropharyngeal erythema.   Eyes:      General: No scleral icterus.     Conjunctiva/sclera: Conjunctivae normal.      Pupils: Pupils are equal, round, and reactive to light.   Cardiovascular:      Rate and Rhythm: Normal rate and regular rhythm.      Heart sounds: Normal heart sounds.   Pulmonary:      Effort: No " respiratory distress.      Breath sounds: No wheezing.   Abdominal:      General: Abdomen is flat. Bowel sounds are normal. There is no distension.      Palpations: Abdomen is soft. There is no mass.      Tenderness: There is no abdominal tenderness. There is no rebound.   Musculoskeletal:         General: Normal range of motion.      Cervical back: Normal range of motion and neck supple.   Skin:     General: Skin is warm and dry.   Neurological:      General: No focal deficit present.      Mental Status: She is alert and oriented to person, place, and time. Mental status is at baseline.   Psychiatric:         Mood and Affect: Mood normal.         Behavior: Behavior normal.         Thought Content: Thought content normal.         Judgment: Judgment normal.         Problem List Items Addressed This Visit             ICD-10-CM    Benign essential hypertension I10    Esophageal reflux K21.9    Choledocholithiasis - Primary K80.50    Relevant Orders    Comprehensive Metabolic Panel    CBC and Auto Differential    Obesity E66.9     Other Visit Diagnoses         Codes    Abnormal LFTs     R79.89    Relevant Orders    Comprehensive Metabolic Panel    CBC and Auto Differential    Arthritis     M19.90          Assessment/Plan       TCM    Dx choledocholithiasis, abnormal LFT's        Sx resolved    Rec's rev'd     Cholelithiasis  Low fat diet discussed  Consider choleycystectomy     Hyperglycemia  on diet  HBA1C 6.0  10-24     Cologuard positive  Colonoscopy 6-24  polyp recheck 27     rheum following arthritis  Skyrizi working     pleurisy  basically resolved     Liver cysts on CT / US   MRI discussed  GI no further workup     Hypertension stable on therapy no side effects     Insomnia stable on therapy no side effects     Allergic rhinitis stable on therapy no side effects     GERD stable on therapy no side effects     Diet and exercise reviewed      Mammogram 1-24  Dexa 11-22 normal  Cologuard +  2-24  Colonoscopy 6-24   recheck 27 polyp  CT chest lung cancer screening n/a  GYN n/a  immunizations rev'd UTD  BMI  42.2    Follow up 3 months / medicare physical

## 2025-01-31 LAB
ALBUMIN SERPL-MCNC: 4.2 G/DL (ref 3.6–5.1)
ALP SERPL-CCNC: 328 U/L (ref 37–153)
ALT SERPL-CCNC: 55 U/L (ref 6–29)
ANION GAP SERPL CALCULATED.4IONS-SCNC: 12 MMOL/L (CALC) (ref 7–17)
AST SERPL-CCNC: 21 U/L (ref 10–35)
BASOPHILS # BLD AUTO: 90 CELLS/UL (ref 0–200)
BASOPHILS NFR BLD AUTO: 0.9 %
BILIRUB SERPL-MCNC: 0.4 MG/DL (ref 0.2–1.2)
BUN SERPL-MCNC: 22 MG/DL (ref 7–25)
CALCIUM SERPL-MCNC: 9.5 MG/DL (ref 8.6–10.4)
CHLORIDE SERPL-SCNC: 102 MMOL/L (ref 98–110)
CO2 SERPL-SCNC: 27 MMOL/L (ref 20–32)
CREAT SERPL-MCNC: 0.95 MG/DL (ref 0.5–1.05)
EGFRCR SERPLBLD CKD-EPI 2021: 66 ML/MIN/1.73M2
EOSINOPHIL # BLD AUTO: 190 CELLS/UL (ref 15–500)
EOSINOPHIL NFR BLD AUTO: 1.9 %
ERYTHROCYTE [DISTWIDTH] IN BLOOD BY AUTOMATED COUNT: 13.9 % (ref 11–15)
GLUCOSE SERPL-MCNC: 95 MG/DL (ref 65–139)
HCT VFR BLD AUTO: 39.2 % (ref 35–45)
HGB BLD-MCNC: 12.7 G/DL (ref 11.7–15.5)
LYMPHOCYTES # BLD AUTO: 2430 CELLS/UL (ref 850–3900)
LYMPHOCYTES NFR BLD AUTO: 24.3 %
MCH RBC QN AUTO: 27.7 PG (ref 27–33)
MCHC RBC AUTO-ENTMCNC: 32.4 G/DL (ref 32–36)
MCV RBC AUTO: 85.6 FL (ref 80–100)
MONOCYTES # BLD AUTO: 580 CELLS/UL (ref 200–950)
MONOCYTES NFR BLD AUTO: 5.8 %
NEUTROPHILS # BLD AUTO: 6710 CELLS/UL (ref 1500–7800)
NEUTROPHILS NFR BLD AUTO: 67.1 %
PLATELET # BLD AUTO: 387 THOUSAND/UL (ref 140–400)
PMV BLD REES-ECKER: 9.8 FL (ref 7.5–12.5)
POTASSIUM SERPL-SCNC: 4 MMOL/L (ref 3.5–5.3)
PROT SERPL-MCNC: 6.5 G/DL (ref 6.1–8.1)
RBC # BLD AUTO: 4.58 MILLION/UL (ref 3.8–5.1)
SODIUM SERPL-SCNC: 141 MMOL/L (ref 135–146)
WBC # BLD AUTO: 10 THOUSAND/UL (ref 3.8–10.8)

## 2025-02-01 DIAGNOSIS — G47.00 INSOMNIA, UNSPECIFIED TYPE: ICD-10-CM

## 2025-02-03 RX ORDER — HYDROXYZINE PAMOATE 25 MG/1
25 CAPSULE ORAL 2 TIMES DAILY
Qty: 180 CAPSULE | Refills: 0 | Status: SHIPPED | OUTPATIENT
Start: 2025-02-03 | End: 2025-05-04

## 2025-02-04 ENCOUNTER — APPOINTMENT (OUTPATIENT)
Dept: PRIMARY CARE | Facility: CLINIC | Age: 65
End: 2025-02-04
Payer: COMMERCIAL

## 2025-02-07 DIAGNOSIS — I10 HYPERTENSION, UNSPECIFIED TYPE: ICD-10-CM

## 2025-02-10 RX ORDER — OLMESARTAN MEDOXOMIL AND HYDROCHLOROTHIAZIDE 20/12.5 20; 12.5 MG/1; MG/1
1 TABLET ORAL DAILY
Qty: 90 TABLET | Refills: 0 | Status: SHIPPED | OUTPATIENT
Start: 2025-02-10

## 2025-05-02 DIAGNOSIS — G47.00 INSOMNIA, UNSPECIFIED TYPE: ICD-10-CM

## 2025-05-05 ASSESSMENT — PROMIS GLOBAL HEALTH SCALE
CARRYOUT_SOCIAL_ACTIVITIES: GOOD
RATE_GENERAL_HEALTH: GOOD
CARRYOUT_SOCIAL_ACTIVITIES: GOOD
RATE_PHYSICAL_HEALTH: GOOD
RATE_QUALITY_OF_LIFE: GOOD
RATE_AVERAGE_FATIGUE: MILD
RATE_SOCIAL_SATISFACTION: GOOD
CARRYOUT_SOCIAL_ACTIVITIES: GOOD
RATE_AVERAGE_PAIN: 3
RATE_MENTAL_HEALTH: GOOD
RATE_AVERAGE_FATIGUE: MILD
RATE_MENTAL_HEALTH: GOOD
RATE_AVERAGE_PAIN: 3
RATE_MENTAL_HEALTH: GOOD
RATE_GENERAL_HEALTH: GOOD
RATE_QUALITY_OF_LIFE: GOOD
RATE_AVERAGE_FATIGUE: MILD
CARRYOUT_PHYSICAL_ACTIVITIES: COMPLETELY
RATE_GENERAL_HEALTH: GOOD
RATE_PHYSICAL_HEALTH: GOOD
RATE_AVERAGE_PAIN: 3
RATE_SOCIAL_SATISFACTION: GOOD
CARRYOUT_PHYSICAL_ACTIVITIES: COMPLETELY
CARRYOUT_PHYSICAL_ACTIVITIES: COMPLETELY
EMOTIONAL_PROBLEMS: SOMETIMES
RATE_QUALITY_OF_LIFE: GOOD
EMOTIONAL_PROBLEMS: SOMETIMES
RATE_PHYSICAL_HEALTH: GOOD
EMOTIONAL_PROBLEMS: SOMETIMES
RATE_SOCIAL_SATISFACTION: GOOD

## 2025-05-07 ENCOUNTER — APPOINTMENT (OUTPATIENT)
Dept: PRIMARY CARE | Facility: CLINIC | Age: 65
End: 2025-05-07
Payer: COMMERCIAL

## 2025-05-07 RX ORDER — HYDROXYZINE PAMOATE 25 MG/1
25 CAPSULE ORAL 2 TIMES DAILY
Qty: 60 CAPSULE | Refills: 0 | Status: SHIPPED | OUTPATIENT
Start: 2025-05-07 | End: 2025-08-05

## 2025-05-13 ENCOUNTER — TELEMEDICINE (OUTPATIENT)
Dept: PRIMARY CARE | Facility: CLINIC | Age: 65
End: 2025-05-13
Payer: COMMERCIAL

## 2025-05-13 DIAGNOSIS — J20.9 ACUTE BRONCHITIS, UNSPECIFIED ORGANISM: Primary | ICD-10-CM

## 2025-05-13 DIAGNOSIS — E66.813 CLASS 3 SEVERE OBESITY DUE TO EXCESS CALORIES WITH SERIOUS COMORBIDITY AND BODY MASS INDEX (BMI) OF 40.0 TO 44.9 IN ADULT: ICD-10-CM

## 2025-05-13 DIAGNOSIS — K21.9 GASTROESOPHAGEAL REFLUX DISEASE WITHOUT ESOPHAGITIS: ICD-10-CM

## 2025-05-13 DIAGNOSIS — R73.9 HYPERGLYCEMIA: ICD-10-CM

## 2025-05-13 DIAGNOSIS — I10 HYPERTENSION, UNSPECIFIED TYPE: ICD-10-CM

## 2025-05-13 PROCEDURE — 99214 OFFICE O/P EST MOD 30 MIN: CPT | Performed by: INTERNAL MEDICINE

## 2025-05-13 PROCEDURE — 1160F RVW MEDS BY RX/DR IN RCRD: CPT | Performed by: INTERNAL MEDICINE

## 2025-05-13 PROCEDURE — 1159F MED LIST DOCD IN RCRD: CPT | Performed by: INTERNAL MEDICINE

## 2025-05-13 PROCEDURE — 1036F TOBACCO NON-USER: CPT | Performed by: INTERNAL MEDICINE

## 2025-05-13 RX ORDER — DOXYCYCLINE 100 MG/1
100 CAPSULE ORAL 2 TIMES DAILY
Qty: 20 CAPSULE | Refills: 0 | Status: SHIPPED | OUTPATIENT
Start: 2025-05-13 | End: 2025-05-23

## 2025-05-13 RX ORDER — ALBUTEROL SULFATE 90 UG/1
2 INHALANT RESPIRATORY (INHALATION) EVERY 4 HOURS PRN
Qty: 8.5 G | Refills: 0 | Status: SHIPPED | OUTPATIENT
Start: 2025-05-13 | End: 2026-05-13

## 2025-05-13 RX ORDER — BENZONATATE 100 MG/1
100 CAPSULE ORAL 3 TIMES DAILY PRN
Qty: 30 CAPSULE | Refills: 0 | Status: SHIPPED | OUTPATIENT
Start: 2025-05-13 | End: 2025-05-23

## 2025-05-13 ASSESSMENT — ENCOUNTER SYMPTOMS
MYALGIAS: 0
WHEEZING: 0
SWEATS: 0
SHORTNESS OF BREATH: 1
HEARTBURN: 0
HEMOPTYSIS: 1
SORE THROAT: 1
RHINORRHEA: 0
FEVER: 0
WEIGHT LOSS: 0
COUGH: 1
CHILLS: 1
HEADACHES: 0

## 2025-05-13 NOTE — PROGRESS NOTES
Subjective   Patient ID: Dionne Jolly is a 65 y.o. female who presents for Bronchitis.  Virtual or Telephone Consent    An interactive audio and video telecommunication system which permits real time communications between the patient (at the originating site) and provider (at the distant site) was utilized to provide this telehealth service.   Verbal consent was requested and obtained from Arianne Jolly on this date, 05/13/25 for a telehealth visit and the patient's location was confirmed at the time of the visit.   Cough  This is a new problem. The current episode started in the past 7 days. The problem has been waxing and waning. The problem occurs every few minutes. The cough is Productive of purulent sputum. Associated symptoms include chills, ear congestion, ear pain, hemoptysis, postnasal drip, a sore throat and shortness of breath. Pertinent negatives include no chest pain, fever, headaches, heartburn, myalgias, nasal congestion, rash, rhinorrhea, sweats, weight loss or wheezing.        Tele health     Same day sick  Started getting sick 5-7-25  Took e mycin for nasal drainage by MD relative  Cough discolored,  dyspnea, chills, ear pain, sore throat  No wheezing or fever  Acute bronchitis  Doxycycline 100 mg twice daily #20 no refill  Tessalon Perles as directed  Albuterol inhaler as needed shortness of breath  Risk / benefits reviewed  Rest increase hydration    choledocholithiasis, abnormal LFT's        Sx resolved     Cholelithiasis  Low fat diet discussed  Consider choleycystectomy     Hyperglycemia  on diet  HBA1C 6.0  10-24     Cologuard positive  Colonoscopy 6-24  polyp recheck 27     rheum following arthritis  Christina working next dose June     pleurisy  basically resolved     Liver cysts on CT / US   MRI discussed  GI no further workup     Hypertension stable on therapy no side effects     Insomnia stable on therapy no side effects     Allergic rhinitis stable on therapy no side effects     GERD  stable on therapy no side effects     Diet and exercise reviewed      Review of Systems   Constitutional:  Positive for chills. Negative for fever and weight loss.   HENT:  Positive for ear pain, postnasal drip and sore throat. Negative for rhinorrhea.    Respiratory:  Positive for cough, hemoptysis and shortness of breath. Negative for wheezing.    Cardiovascular:  Negative for chest pain.   Gastrointestinal:  Negative for heartburn.   Musculoskeletal:  Negative for myalgias.   Skin:  Negative for rash.   Neurological:  Negative for headaches.   All other systems reviewed and are negative.      Objective   There were no vitals taken for this visit.    Physical Exam  Constitutional:       Appearance: She is obese. She is ill-appearing.   Pulmonary:      Effort: Pulmonary effort is normal.   Neurological:      Mental Status: She is alert.   Psychiatric:         Mood and Affect: Mood normal.         Assessment/Plan   Problem List Items Addressed This Visit           ICD-10-CM    Esophageal reflux K21.9    Obesity E66.9     Other Visit Diagnoses         Codes      Acute bronchitis, unspecified organism    -  Primary J20.9    Relevant Medications    doxycycline (Vibramycin) 100 mg capsule    benzonatate (Tessalon) 100 mg capsule    albuterol (ProAir HFA) 90 mcg/actuation inhaler      Hypertension, unspecified type     I10      Hyperglycemia     R73.9                  [facility]     Same day sick    Started getting sick 5-7-25  Took e mycin for nasal drainage by MD relative  Cough discolored,  dyspnea, chills, ear pain, sore throat  No wheezing or fever  Acute bronchitis  Doxycycline 100 mg twice daily #20 no refill  Tessalon Perles as directed  Albuterol inhaler as needed shortness of breath  Risk / benefits reviewed  Rest increase hydration    choledocholithiasis, abnormal LFT's        Sx resolved     Cholelithiasis  Low fat diet discussed  Consider choleycystectomy     Hyperglycemia  on diet  HBA1C 6.0  10-24      Cologuard positive  Colonoscopy 6-24  polyp recheck 27     rheum following arthritis  Christina working next dose June     pleurisy  basically resolved     Liver cysts on CT / US   MRI discussed  GI no further workup     Hypertension stable on therapy no side effects     Insomnia stable on therapy no side effects     Allergic rhinitis stable on therapy no side effects     GERD stable on therapy no side effects     Diet and exercise reviewed      Mammogram 1-24  Dexa 11-22 normal  Cologuard +  2-24  Colonoscopy 6-24  recheck 27 polyp  CT chest lung cancer screening n/a  GYN n/a  immunizations rev'd UTD  BMI  42.2     Follow up 1 month  / yearly physical

## 2025-05-14 ENCOUNTER — APPOINTMENT (OUTPATIENT)
Dept: PRIMARY CARE | Facility: CLINIC | Age: 65
End: 2025-05-14
Payer: COMMERCIAL

## 2025-05-29 ENCOUNTER — TELEMEDICINE (OUTPATIENT)
Dept: PRIMARY CARE | Facility: CLINIC | Age: 65
End: 2025-05-29
Payer: COMMERCIAL

## 2025-05-29 ENCOUNTER — APPOINTMENT (OUTPATIENT)
Dept: PRIMARY CARE | Facility: CLINIC | Age: 65
End: 2025-05-29
Payer: COMMERCIAL

## 2025-05-29 DIAGNOSIS — J01.90 ACUTE NON-RECURRENT SINUSITIS, UNSPECIFIED LOCATION: Primary | ICD-10-CM

## 2025-05-29 DIAGNOSIS — E66.813 CLASS 3 SEVERE OBESITY DUE TO EXCESS CALORIES WITH SERIOUS COMORBIDITY AND BODY MASS INDEX (BMI) OF 40.0 TO 44.9 IN ADULT: ICD-10-CM

## 2025-05-29 DIAGNOSIS — I10 HYPERTENSION, UNSPECIFIED TYPE: ICD-10-CM

## 2025-05-29 DIAGNOSIS — K21.9 GASTROESOPHAGEAL REFLUX DISEASE WITHOUT ESOPHAGITIS: ICD-10-CM

## 2025-05-29 PROCEDURE — 99214 OFFICE O/P EST MOD 30 MIN: CPT | Performed by: INTERNAL MEDICINE

## 2025-05-29 PROCEDURE — 1159F MED LIST DOCD IN RCRD: CPT | Performed by: INTERNAL MEDICINE

## 2025-05-29 PROCEDURE — 1160F RVW MEDS BY RX/DR IN RCRD: CPT | Performed by: INTERNAL MEDICINE

## 2025-05-29 PROCEDURE — 1036F TOBACCO NON-USER: CPT | Performed by: INTERNAL MEDICINE

## 2025-05-29 RX ORDER — AZITHROMYCIN 500 MG/1
500 TABLET, FILM COATED ORAL DAILY
Qty: 7 TABLET | Refills: 0 | Status: SHIPPED | OUTPATIENT
Start: 2025-05-29 | End: 2025-06-05

## 2025-05-29 ASSESSMENT — ENCOUNTER SYMPTOMS
DIARRHEA: 0
NECK PAIN: 0
SORE THROAT: 1
ABDOMINAL PAIN: 0
COUGH: 1
TROUBLE SWALLOWING: 0
SWOLLEN GLANDS: 0
HOARSE VOICE: 1
VOMITING: 0
HEADACHES: 0
SHORTNESS OF BREATH: 0
STRIDOR: 0

## 2025-05-29 NOTE — PROGRESS NOTES
Subjective   Patient ID: Dionne Jolly is a 65 y.o. female who presents for Sore Throat, Nasal Congestion (Green mucus ), and Sick Visit (Finished antibiotics - symptoms came back - wants refill ).  Virtual or Telephone Consent    An interactive audio and video telecommunication system which permits real time communications between the patient (at the originating site) and provider (at the distant site) was utilized to provide this telehealth service.   Verbal consent was requested and obtained from Arianne Jolly on this date, 05/29/25 for a telehealth visit and the patient's location was confirmed at the time of the visit.   Sore Throat   This is a recurrent problem. The current episode started yesterday. The problem has been gradually worsening. Neither side of throat is experiencing more pain than the other. There has been no fever. The pain is mild. Associated symptoms include congestion, coughing, ear pain, a hoarse voice and a plugged ear sensation. Pertinent negatives include no abdominal pain, diarrhea, drooling, ear discharge, headaches, neck pain, shortness of breath, stridor, swollen glands, trouble swallowing or vomiting.        Tele health      Same day sick     Started getting sick 5-7-25  Treated for acute bronchitis with Doxycycline 100 mg twice daily #20   And Tessalon Perles as directed  Sx resolved then few days ago felt tired  Sinus pressure green discharge x few days  Dry cough  No fever  Acute sinusitis  Zithromax 500 mg daily #7  no refills  Risk / benefits reviewed  Rest increase hydration     choledocholithiasis, abnormal LFT's        Sx resolved     Cholelithiasis  Low fat diet discussed  Consider choleycystectomy     Hyperglycemia  on diet  HBA1C 6.0  10-24     Cologuard positive  Colonoscopy 6-24  polyp recheck 27     rheum following arthritis  Christina working next dose June     pleurisy  basically resolved     Liver cysts on CT / US   MRI discussed  GI no further workup     Hypertension  stable on therapy no side effects     Insomnia stable on therapy no side effects     Allergic rhinitis stable on therapy no side effects     GERD stable on therapy no side effects     Diet and exercise reviewed      Review of Systems   HENT:  Positive for congestion, ear pain, hoarse voice and sore throat. Negative for drooling, ear discharge and trouble swallowing.    Respiratory:  Positive for cough. Negative for shortness of breath and stridor.    Gastrointestinal:  Negative for abdominal pain, diarrhea and vomiting.   Musculoskeletal:  Negative for neck pain.   Neurological:  Negative for headaches.   All other systems reviewed and are negative.      Objective   There were no vitals taken for this visit.    Physical Exam  Constitutional:       Appearance: Normal appearance. She is obese.   Pulmonary:      Effort: Pulmonary effort is normal.   Neurological:      Mental Status: She is alert.   Psychiatric:         Mood and Affect: Mood normal.         Assessment/Plan   Problem List Items Addressed This Visit           ICD-10-CM    Esophageal reflux K21.9    Obesity E66.9     Other Visit Diagnoses         Codes      Acute non-recurrent sinusitis, unspecified location    -  Primary J01.90    Relevant Medications    azithromycin (Zithromax) 500 mg tablet      Hypertension, unspecified type     I10                 Tele health      Same day sick     Started getting sick 5-7-25  Treated for acute bronchitis with Doxycycline 100 mg twice daily #20   And Tessalon Perles as directed  Sx resolved then few days ago felt tired  Sinus pressure green discharge x few days  Dry cough  No fever  Acute sinusitis  Zithromax 500 mg daily #7  no refills  Risk / benefits reviewed  Rest increase hydration     choledocholithiasis, abnormal LFT's        Sx resolved     Cholelithiasis  Low fat diet discussed  Consider choleycystectomy     Hyperglycemia  on diet  HBA1C 6.0  10-24     Cologuard positive  Colonoscopy 6-24  polyp recheck 27      rheum following arthritis  Christina working next dose June     pleurisy  basically resolved     Liver cysts on CT / US   MRI discussed  GI no further workup     Hypertension stable on therapy no side effects     Insomnia stable on therapy no side effects     Allergic rhinitis stable on therapy no side effects     GERD stable on therapy no side effects     Diet and exercise reviewed      Mammogram 1-24  Dexa 11-22 normal  Cologuard +  2-24  Colonoscopy 6-24  recheck 27 polyp  CT chest lung cancer screening n/a  GYN n/a  immunizations rev'd UTD  BMI  42.2    Follow up 3 weeks / yearly physical

## 2025-06-02 ENCOUNTER — APPOINTMENT (OUTPATIENT)
Dept: PRIMARY CARE | Facility: CLINIC | Age: 65
End: 2025-06-02
Payer: COMMERCIAL

## 2025-06-04 DIAGNOSIS — I10 HYPERTENSION, UNSPECIFIED TYPE: ICD-10-CM

## 2025-06-06 DIAGNOSIS — G47.00 INSOMNIA, UNSPECIFIED TYPE: ICD-10-CM

## 2025-06-08 RX ORDER — OLMESARTAN MEDOXOMIL AND HYDROCHLOROTHIAZIDE 20/12.5 20; 12.5 MG/1; MG/1
1 TABLET ORAL DAILY
Qty: 30 TABLET | Refills: 0 | Status: SHIPPED | OUTPATIENT
Start: 2025-06-08

## 2025-06-08 RX ORDER — HYDROXYZINE PAMOATE 25 MG/1
25 CAPSULE ORAL 2 TIMES DAILY PRN
Qty: 60 CAPSULE | Refills: 0 | Status: SHIPPED | OUTPATIENT
Start: 2025-06-08 | End: 2025-08-07

## 2025-06-11 LAB
ALBUMIN SERPL-MCNC: 4.1 G/DL (ref 3.6–5.1)
ALBUMIN/GLOB SERPL: 2.1 (CALC) (ref 1–2.5)
ALP SERPL-CCNC: 92 U/L (ref 37–153)
ALT SERPL-CCNC: 13 U/L (ref 6–29)
AST SERPL-CCNC: 15 U/L (ref 10–35)
BASOPHILS # BLD AUTO: 73 CELLS/UL (ref 0–200)
BASOPHILS NFR BLD AUTO: 0.8 %
BILIRUB SERPL-MCNC: 0.4 MG/DL (ref 0.2–1.2)
BUN SERPL-MCNC: 19 MG/DL (ref 7–25)
BUN/CREAT SERPL: ABNORMAL (CALC) (ref 6–22)
CALCIUM SERPL-MCNC: 8.9 MG/DL (ref 8.6–10.4)
CHLORIDE SERPL-SCNC: 107 MMOL/L (ref 98–110)
CO2 SERPL-SCNC: 24 MMOL/L (ref 20–32)
CREAT SERPL-MCNC: 0.91 MG/DL (ref 0.5–1.05)
CRP SERPL-MCNC: 19.9 MG/L
EGFRCR SERPLBLD CKD-EPI 2021: 69 ML/MIN/1.73M2
EOSINOPHIL # BLD AUTO: 218 CELLS/UL (ref 15–500)
EOSINOPHIL NFR BLD AUTO: 2.4 %
ERYTHROCYTE [DISTWIDTH] IN BLOOD BY AUTOMATED COUNT: 12.9 % (ref 11–15)
ERYTHROCYTE [SEDIMENTATION RATE] IN BLOOD BY WESTERGREN METHOD: 17 MM/H
GLOBULIN SER CALC-MCNC: 2 G/DL (CALC) (ref 1.9–3.7)
GLUCOSE SERPL-MCNC: 125 MG/DL (ref 65–99)
HCT VFR BLD AUTO: 40.6 % (ref 35–45)
HGB BLD-MCNC: 13.2 G/DL (ref 11.7–15.5)
IGA SERPL-MCNC: 94 MG/DL (ref 70–320)
IGE SERPL-ACNC: 5 KU/L
IGG SERPL-MCNC: 635 MG/DL (ref 600–1540)
IGM SERPL-MCNC: 64 MG/DL (ref 50–300)
LYMPHOCYTES # BLD AUTO: 1611 CELLS/UL (ref 850–3900)
LYMPHOCYTES NFR BLD AUTO: 17.7 %
MCH RBC QN AUTO: 28.1 PG (ref 27–33)
MCHC RBC AUTO-ENTMCNC: 32.5 G/DL (ref 32–36)
MCV RBC AUTO: 86.4 FL (ref 80–100)
MONOCYTES # BLD AUTO: 519 CELLS/UL (ref 200–950)
MONOCYTES NFR BLD AUTO: 5.7 %
NEUTROPHILS # BLD AUTO: 6679 CELLS/UL (ref 1500–7800)
NEUTROPHILS NFR BLD AUTO: 73.4 %
PLATELET # BLD AUTO: 228 THOUSAND/UL (ref 140–400)
PMV BLD REES-ECKER: 9.4 FL (ref 7.5–12.5)
POTASSIUM SERPL-SCNC: 4 MMOL/L (ref 3.5–5.3)
PROT SERPL-MCNC: 6.1 G/DL (ref 6.1–8.1)
RBC # BLD AUTO: 4.7 MILLION/UL (ref 3.8–5.1)
SODIUM SERPL-SCNC: 143 MMOL/L (ref 135–146)
T4 FREE SERPL-MCNC: 1.1 NG/DL (ref 0.8–1.8)
TSH SERPL-ACNC: 1.64 MIU/L (ref 0.4–4.5)
URATE SERPL-MCNC: 4.3 MG/DL (ref 2.5–7)
WBC # BLD AUTO: 9.1 THOUSAND/UL (ref 3.8–10.8)

## 2025-06-23 DIAGNOSIS — E55.9 VITAMIN D DEFICIENCY: ICD-10-CM

## 2025-06-23 DIAGNOSIS — Z00.00 ANNUAL PHYSICAL EXAM: Primary | ICD-10-CM

## 2025-06-23 DIAGNOSIS — E78.00 HYPERCHOLESTEREMIA: ICD-10-CM

## 2025-06-24 LAB
25(OH)D3+25(OH)D2 SERPL-MCNC: 76 NG/ML (ref 30–100)
ALBUMIN SERPL-MCNC: 4.3 G/DL (ref 3.6–5.1)
ALP SERPL-CCNC: 81 U/L (ref 37–153)
ALT SERPL-CCNC: 12 U/L (ref 6–29)
ANION GAP SERPL CALCULATED.4IONS-SCNC: 9 MMOL/L (CALC) (ref 7–17)
AST SERPL-CCNC: 15 U/L (ref 10–35)
BASOPHILS # BLD AUTO: 53 CELLS/UL (ref 0–200)
BASOPHILS NFR BLD AUTO: 0.7 %
BILIRUB SERPL-MCNC: 0.4 MG/DL (ref 0.2–1.2)
BUN SERPL-MCNC: 26 MG/DL (ref 7–25)
CALCIUM SERPL-MCNC: 9.2 MG/DL (ref 8.6–10.4)
CHLORIDE SERPL-SCNC: 104 MMOL/L (ref 98–110)
CHOLEST SERPL-MCNC: 204 MG/DL
CHOLEST/HDLC SERPL: 3.2 (CALC)
CO2 SERPL-SCNC: 27 MMOL/L (ref 20–32)
CREAT SERPL-MCNC: 0.96 MG/DL (ref 0.5–1.05)
EGFRCR SERPLBLD CKD-EPI 2021: 65 ML/MIN/1.73M2
EOSINOPHIL # BLD AUTO: 114 CELLS/UL (ref 15–500)
EOSINOPHIL NFR BLD AUTO: 1.5 %
ERYTHROCYTE [DISTWIDTH] IN BLOOD BY AUTOMATED COUNT: 13.5 % (ref 11–15)
GLUCOSE SERPL-MCNC: 91 MG/DL (ref 65–99)
HCT VFR BLD AUTO: 42.5 % (ref 35–45)
HDLC SERPL-MCNC: 64 MG/DL
HGB BLD-MCNC: 13.3 G/DL (ref 11.7–15.5)
LDLC SERPL CALC-MCNC: 121 MG/DL (CALC)
LYMPHOCYTES # BLD AUTO: 1725 CELLS/UL (ref 850–3900)
LYMPHOCYTES NFR BLD AUTO: 22.7 %
MCH RBC QN AUTO: 27.2 PG (ref 27–33)
MCHC RBC AUTO-ENTMCNC: 31.3 G/DL (ref 32–36)
MCV RBC AUTO: 86.9 FL (ref 80–100)
MONOCYTES # BLD AUTO: 410 CELLS/UL (ref 200–950)
MONOCYTES NFR BLD AUTO: 5.4 %
NEUTROPHILS # BLD AUTO: 5297 CELLS/UL (ref 1500–7800)
NEUTROPHILS NFR BLD AUTO: 69.7 %
NONHDLC SERPL-MCNC: 140 MG/DL (CALC)
PLATELET # BLD AUTO: 274 THOUSAND/UL (ref 140–400)
PMV BLD REES-ECKER: 9.2 FL (ref 7.5–12.5)
POTASSIUM SERPL-SCNC: 4.5 MMOL/L (ref 3.5–5.3)
PROT SERPL-MCNC: 6.4 G/DL (ref 6.1–8.1)
RBC # BLD AUTO: 4.89 MILLION/UL (ref 3.8–5.1)
SODIUM SERPL-SCNC: 140 MMOL/L (ref 135–146)
TRIGL SERPL-MCNC: 88 MG/DL
TSH SERPL-ACNC: 1.98 MIU/L (ref 0.4–4.5)
WBC # BLD AUTO: 7.6 THOUSAND/UL (ref 3.8–10.8)

## 2025-06-25 ENCOUNTER — APPOINTMENT (OUTPATIENT)
Dept: PRIMARY CARE | Facility: CLINIC | Age: 65
End: 2025-06-25
Payer: COMMERCIAL

## 2025-06-25 VITALS
TEMPERATURE: 97.3 F | DIASTOLIC BLOOD PRESSURE: 70 MMHG | BODY MASS INDEX: 45.8 KG/M2 | OXYGEN SATURATION: 97 % | HEART RATE: 95 BPM | SYSTOLIC BLOOD PRESSURE: 108 MMHG | WEIGHT: 291.8 LBS | HEIGHT: 67 IN

## 2025-06-25 DIAGNOSIS — E66.813 CLASS 3 SEVERE OBESITY DUE TO EXCESS CALORIES WITH SERIOUS COMORBIDITY AND BODY MASS INDEX (BMI) OF 45.0 TO 49.9 IN ADULT: ICD-10-CM

## 2025-06-25 DIAGNOSIS — E78.00 HYPERCHOLESTEREMIA: ICD-10-CM

## 2025-06-25 DIAGNOSIS — Z71.2 ENCOUNTER TO DISCUSS TEST RESULTS: ICD-10-CM

## 2025-06-25 DIAGNOSIS — Z00.00 ROUTINE GENERAL MEDICAL EXAMINATION AT A HEALTH CARE FACILITY: Primary | ICD-10-CM

## 2025-06-25 DIAGNOSIS — E55.9 VITAMIN D DEFICIENCY: ICD-10-CM

## 2025-06-25 DIAGNOSIS — K21.9 GASTROESOPHAGEAL REFLUX DISEASE WITHOUT ESOPHAGITIS: ICD-10-CM

## 2025-06-25 DIAGNOSIS — R73.9 HYPERGLYCEMIA: ICD-10-CM

## 2025-06-25 DIAGNOSIS — I10 HYPERTENSION, UNSPECIFIED TYPE: ICD-10-CM

## 2025-06-25 PROCEDURE — 1160F RVW MEDS BY RX/DR IN RCRD: CPT | Performed by: INTERNAL MEDICINE

## 2025-06-25 PROCEDURE — 3074F SYST BP LT 130 MM HG: CPT | Performed by: INTERNAL MEDICINE

## 2025-06-25 PROCEDURE — 99214 OFFICE O/P EST MOD 30 MIN: CPT | Performed by: INTERNAL MEDICINE

## 2025-06-25 PROCEDURE — 1036F TOBACCO NON-USER: CPT | Performed by: INTERNAL MEDICINE

## 2025-06-25 PROCEDURE — 3008F BODY MASS INDEX DOCD: CPT | Performed by: INTERNAL MEDICINE

## 2025-06-25 PROCEDURE — 3078F DIAST BP <80 MM HG: CPT | Performed by: INTERNAL MEDICINE

## 2025-06-25 PROCEDURE — 99397 PER PM REEVAL EST PAT 65+ YR: CPT | Performed by: INTERNAL MEDICINE

## 2025-06-25 PROCEDURE — 1159F MED LIST DOCD IN RCRD: CPT | Performed by: INTERNAL MEDICINE

## 2025-06-25 RX ORDER — OLMESARTAN MEDOXOMIL AND HYDROCHLOROTHIAZIDE 20/12.5 20; 12.5 MG/1; MG/1
1 TABLET ORAL DAILY
Qty: 90 TABLET | Refills: 0 | Status: SHIPPED | OUTPATIENT
Start: 2025-06-25

## 2025-06-25 ASSESSMENT — ANXIETY QUESTIONNAIRES
IF YOU CHECKED OFF ANY PROBLEMS ON THIS QUESTIONNAIRE, HOW DIFFICULT HAVE THESE PROBLEMS MADE IT FOR YOU TO DO YOUR WORK, TAKE CARE OF THINGS AT HOME, OR GET ALONG WITH OTHER PEOPLE: NOT DIFFICULT AT ALL
3. WORRYING TOO MUCH ABOUT DIFFERENT THINGS: MORE THAN HALF THE DAYS
GAD7 TOTAL SCORE: 7
6. BECOMING EASILY ANNOYED OR IRRITABLE: SEVERAL DAYS
4. TROUBLE RELAXING: NEARLY EVERY DAY
7. FEELING AFRAID AS IF SOMETHING AWFUL MIGHT HAPPEN: SEVERAL DAYS
5. BEING SO RESTLESS THAT IT IS HARD TO SIT STILL: NOT AT ALL
2. NOT BEING ABLE TO STOP OR CONTROL WORRYING: NOT AT ALL
1. FEELING NERVOUS, ANXIOUS, OR ON EDGE: NOT AT ALL

## 2025-06-25 ASSESSMENT — PATIENT HEALTH QUESTIONNAIRE - PHQ9
1. LITTLE INTEREST OR PLEASURE IN DOING THINGS: NOT AT ALL
2. FEELING DOWN, DEPRESSED OR HOPELESS: NOT AT ALL
SUM OF ALL RESPONSES TO PHQ9 QUESTIONS 1 AND 2: 0

## 2025-06-25 ASSESSMENT — ENCOUNTER SYMPTOMS
COUGH: 1
SORE THROAT: 1

## 2025-06-25 NOTE — PROGRESS NOTES
"Subjective   Patient ID: Arianne Jolly \"Dionne\" is a 65 y.o. female who presents for Annual Exam, Sore Throat, and Cough.  Sore Throat   Associated symptoms include coughing.   Cough  Associated symptoms include a sore throat.       Yearly physical    Mammogram 1-24 pt declined  Dexa 11-22 normal  Cologuard +  2-24  Colonoscopy 6-24  recheck 27 polyp  CT chest lung cancer screening n/a  GYN n/a  immunizations rev'd UTD  BMI  45.7    Follow up    Labs rev'd    Sore throat, cough resolving  Since 5-29-25  Continue tessalon perles prn  follow    choledocholithiasis, abnormal LFT's        Sx resolved     Cholelithiasis  Low fat diet discussed  Consider choleycystectomy     Hyperglycemia  on diet  HBA1C 6.0  10-24     Cologuard positive  Colonoscopy 6-24  polyp recheck 27     rheum following arthritis  Christina working next dose June     pleurisy  basically resolved     Liver cysts on CT / US   MRI discussed  GI no further workup     Hypertension stable on therapy no side effects     Insomnia stable on therapy no side effects     Allergic rhinitis stable on therapy no side effects     GERD stable on therapy no side effects     Diet and exercise reviewed      Review of Systems   HENT:  Positive for sore throat.    Respiratory:  Positive for cough.    All other systems reviewed and are negative.      Objective   /70   Pulse 95   Temp 36.3 °C (97.3 °F)   Ht 1.702 m (5' 7\")   Wt 132 kg (291 lb 12.8 oz)   SpO2 97%   BMI 45.70 kg/m²   Lab Results   Component Value Date    WBC 7.6 06/23/2025    HGB 13.3 06/23/2025    HCT 42.5 06/23/2025     06/23/2025    CHOL 204 (H) 06/23/2025    TRIG 88 06/23/2025    HDL 64 06/23/2025    ALT 12 06/23/2025    AST 15 06/23/2025     06/23/2025    K 4.5 06/23/2025     06/23/2025    CREATININE 0.96 06/23/2025    BUN 26 (H) 06/23/2025    CO2 27 06/23/2025    TSH 1.98 06/23/2025    HGBA1C 6.0 (H) 10/24/2024           Physical Exam  Vitals reviewed.   Constitutional:       " Appearance: Normal appearance. She is obese.   HENT:      Head: Normocephalic and atraumatic.      Mouth/Throat:      Pharynx: No posterior oropharyngeal erythema.   Eyes:      General: No scleral icterus.     Conjunctiva/sclera: Conjunctivae normal.      Pupils: Pupils are equal, round, and reactive to light.   Cardiovascular:      Rate and Rhythm: Normal rate and regular rhythm.      Heart sounds: Normal heart sounds.   Pulmonary:      Effort: No respiratory distress.      Breath sounds: No wheezing.   Abdominal:      General: Abdomen is flat. Bowel sounds are normal. There is no distension.      Palpations: Abdomen is soft. There is no mass.      Tenderness: There is no abdominal tenderness. There is no rebound.   Musculoskeletal:         General: Normal range of motion.      Cervical back: Normal range of motion and neck supple.   Skin:     General: Skin is warm and dry.   Neurological:      General: No focal deficit present.      Mental Status: She is alert and oriented to person, place, and time. Mental status is at baseline.   Psychiatric:         Mood and Affect: Mood normal.         Behavior: Behavior normal.         Thought Content: Thought content normal.         Judgment: Judgment normal.         Problem List Items Addressed This Visit           ICD-10-CM    Esophageal reflux K21.9    Vitamin D deficiency E55.9    Obesity E66.9     Other Visit Diagnoses         Codes      Routine general medical examination at a health care facility    -  Primary Z00.00      Encounter to discuss test results     Z71.2      Hypercholesteremia     E78.00      Hypertension, unspecified type     I10    Relevant Medications    olmesartan-hydrochlorothiazide (BENIcar HCT) 20-12.5 mg tablet      Hyperglycemia     R73.9          Assessment/Plan     Yearly physical    Mammogram 1-24 pt declined  Dexa 11-22 normal  Cologuard +  2-24  Colonoscopy 6-24  recheck 27 polyp  CT chest lung cancer screening n/a  GYN n/a  immunizations  rev'd UTD  BMI  45.7    Follow up    Labs rev'd    Sore throat, cough resolving  Since 5-29-25  Continue tessalon perles prn  follow    choledocholithiasis, abnormal LFT's        Sx resolved     Cholelithiasis  Low fat diet discussed  Consider choleycystectomy    Hypercholesterolemia on diet     Hyperglycemia  on diet  HBA1C 6.0  10-24     Cologuard positive  Colonoscopy 6-24  polyp recheck 27     rheum following arthritis  Christina working next dose June     pleurisy  basically resolved     Liver cysts on CT / US   MRI discussed  GI no further workup     Hypertension stable on therapy no side effects     Insomnia stable on therapy no side effects     Allergic rhinitis stable on therapy no side effects     GERD stable on therapy no side effects    Vit D none on weekends     Diet and exercise reviewed      Follow up 3 months

## 2025-06-25 NOTE — PROGRESS NOTES
"Subjective   Patient ID: Arianne Jolly \"Dionne\" is a 65 y.o. female who presents for Annual Exam, Sore Throat, and Cough.  HPI    Review of Systems    Objective   /70   Pulse 95   Temp 36.3 °C (97.3 °F)   Ht 1.702 m (5' 7\")   Wt 132 kg (291 lb 12.8 oz)   SpO2 97%   BMI 45.70 kg/m²   Lab Results   Component Value Date    WBC 7.6 06/23/2025    HGB 13.3 06/23/2025    HCT 42.5 06/23/2025     06/23/2025    CHOL 204 (H) 06/23/2025    TRIG 88 06/23/2025    HDL 64 06/23/2025    ALT 12 06/23/2025    AST 15 06/23/2025     06/23/2025    K 4.5 06/23/2025     06/23/2025    CREATININE 0.96 06/23/2025    BUN 26 (H) 06/23/2025    CO2 27 06/23/2025    TSH 1.98 06/23/2025    HGBA1C 6.0 (H) 10/24/2024           Physical Exam      Assessment/Plan   "

## 2025-07-01 DIAGNOSIS — I10 HYPERTENSION, UNSPECIFIED TYPE: ICD-10-CM

## 2025-07-01 DIAGNOSIS — G47.00 INSOMNIA, UNSPECIFIED TYPE: ICD-10-CM

## 2025-07-02 DIAGNOSIS — I10 HYPERTENSION, UNSPECIFIED TYPE: ICD-10-CM

## 2025-07-02 RX ORDER — HYDROXYZINE PAMOATE 25 MG/1
25 CAPSULE ORAL 2 TIMES DAILY PRN
Qty: 60 CAPSULE | Refills: 0 | Status: SHIPPED | OUTPATIENT
Start: 2025-07-02 | End: 2025-08-31

## 2025-07-02 RX ORDER — OLMESARTAN MEDOXOMIL AND HYDROCHLOROTHIAZIDE 20/12.5 20; 12.5 MG/1; MG/1
1 TABLET ORAL DAILY
Qty: 90 TABLET | Refills: 0 | Status: SHIPPED | OUTPATIENT
Start: 2025-07-02

## 2025-07-28 DIAGNOSIS — G47.00 INSOMNIA, UNSPECIFIED TYPE: ICD-10-CM

## 2025-07-28 RX ORDER — HYDROXYZINE PAMOATE 25 MG/1
25 CAPSULE ORAL 2 TIMES DAILY PRN
Qty: 60 CAPSULE | Refills: 0 | Status: SHIPPED | OUTPATIENT
Start: 2025-07-28 | End: 2025-09-26

## 2025-08-27 DIAGNOSIS — G47.00 INSOMNIA, UNSPECIFIED TYPE: ICD-10-CM

## 2025-08-27 RX ORDER — HYDROXYZINE PAMOATE 25 MG/1
25 CAPSULE ORAL 2 TIMES DAILY PRN
Qty: 60 CAPSULE | Refills: 0 | Status: SHIPPED | OUTPATIENT
Start: 2025-08-27 | End: 2025-10-26

## 2025-09-02 ENCOUNTER — OFFICE VISIT (OUTPATIENT)
Dept: URGENT CARE | Age: 65
End: 2025-09-02
Payer: COMMERCIAL

## 2025-09-02 ENCOUNTER — TELEPHONE (OUTPATIENT)
Dept: PRIMARY CARE | Facility: CLINIC | Age: 65
End: 2025-09-02
Payer: COMMERCIAL

## 2025-09-02 VITALS
HEART RATE: 101 BPM | TEMPERATURE: 98.1 F | OXYGEN SATURATION: 96 % | RESPIRATION RATE: 14 BRPM | BODY MASS INDEX: 45.67 KG/M2 | DIASTOLIC BLOOD PRESSURE: 80 MMHG | WEIGHT: 291 LBS | SYSTOLIC BLOOD PRESSURE: 125 MMHG | HEIGHT: 67 IN

## 2025-09-02 DIAGNOSIS — H10.32 ACUTE CONJUNCTIVITIS OF LEFT EYE, UNSPECIFIED ACUTE CONJUNCTIVITIS TYPE: Primary | ICD-10-CM

## 2025-09-02 PROCEDURE — 3008F BODY MASS INDEX DOCD: CPT

## 2025-09-02 PROCEDURE — 1160F RVW MEDS BY RX/DR IN RCRD: CPT

## 2025-09-02 PROCEDURE — 1036F TOBACCO NON-USER: CPT

## 2025-09-02 PROCEDURE — 3079F DIAST BP 80-89 MM HG: CPT

## 2025-09-02 PROCEDURE — 99203 OFFICE O/P NEW LOW 30 MIN: CPT

## 2025-09-02 PROCEDURE — 1159F MED LIST DOCD IN RCRD: CPT

## 2025-09-02 PROCEDURE — 3074F SYST BP LT 130 MM HG: CPT

## 2025-09-02 RX ORDER — OFLOXACIN 3 MG/ML
1 SOLUTION/ DROPS OPHTHALMIC 4 TIMES DAILY
Qty: 5 ML | Refills: 0 | Status: SHIPPED | OUTPATIENT
Start: 2025-09-02 | End: 2025-09-09

## 2025-09-02 RX ORDER — ERYTHROMYCIN 5 MG/G
OINTMENT OPHTHALMIC 4 TIMES DAILY
Qty: 3.5 G | Refills: 0 | Status: SHIPPED | OUTPATIENT
Start: 2025-09-02 | End: 2025-09-02 | Stop reason: CLARIF

## 2025-09-02 ASSESSMENT — ENCOUNTER SYMPTOMS
PHOTOPHOBIA: 0
FATIGUE: 0
DIZZINESS: 0
CHEST TIGHTNESS: 0
COUGH: 0
DIARRHEA: 0
EYE REDNESS: 1
EYE ITCHING: 0
ABDOMINAL PAIN: 0
VOMITING: 0
EYE DISCHARGE: 1
FEVER: 0
EYE PAIN: 0
SHORTNESS OF BREATH: 0
CHILLS: 0

## 2025-09-02 ASSESSMENT — VISUAL ACUITY
OD_CC: 20/20
OU: 1
OS_CC: 20/25

## 2025-09-24 ENCOUNTER — APPOINTMENT (OUTPATIENT)
Dept: PRIMARY CARE | Facility: CLINIC | Age: 65
End: 2025-09-24
Payer: COMMERCIAL